# Patient Record
Sex: MALE | Race: WHITE | NOT HISPANIC OR LATINO
[De-identification: names, ages, dates, MRNs, and addresses within clinical notes are randomized per-mention and may not be internally consistent; named-entity substitution may affect disease eponyms.]

---

## 2017-05-16 ENCOUNTER — APPOINTMENT (OUTPATIENT)
Dept: VASCULAR SURGERY | Facility: CLINIC | Age: 32
End: 2017-05-16

## 2019-05-17 ENCOUNTER — INPATIENT (INPATIENT)
Facility: HOSPITAL | Age: 34
LOS: 2 days | Discharge: HOME | DRG: 871 | End: 2019-05-20
Attending: SURGERY | Admitting: SURGERY
Payer: MEDICARE

## 2019-05-17 VITALS
SYSTOLIC BLOOD PRESSURE: 171 MMHG | OXYGEN SATURATION: 96 % | HEIGHT: 76 IN | DIASTOLIC BLOOD PRESSURE: 92 MMHG | HEART RATE: 117 BPM | WEIGHT: 274.03 LBS | TEMPERATURE: 101 F | RESPIRATION RATE: 20 BRPM

## 2019-05-17 PROCEDURE — 99285 EMERGENCY DEPT VISIT HI MDM: CPT | Mod: GC

## 2019-05-17 RX ORDER — ACETAMINOPHEN 500 MG
975 TABLET ORAL ONCE
Refills: 0 | Status: COMPLETED | OUTPATIENT
Start: 2019-05-17 | End: 2019-05-17

## 2019-05-17 RX ORDER — ONDANSETRON 8 MG/1
4 TABLET, FILM COATED ORAL ONCE
Refills: 0 | Status: COMPLETED | OUTPATIENT
Start: 2019-05-17 | End: 2019-05-17

## 2019-05-17 RX ADMIN — ONDANSETRON 4 MILLIGRAM(S): 8 TABLET, FILM COATED ORAL at 23:57

## 2019-05-17 RX ADMIN — Medication 975 MILLIGRAM(S): at 23:56

## 2019-05-18 DIAGNOSIS — K57.20 DIVERTICULITIS OF LARGE INTESTINE WITH PERFORATION AND ABSCESS WITHOUT BLEEDING: ICD-10-CM

## 2019-05-18 LAB
ALBUMIN SERPL ELPH-MCNC: 4.3 G/DL — SIGNIFICANT CHANGE UP (ref 3.3–5)
ALP SERPL-CCNC: 76 U/L — SIGNIFICANT CHANGE UP (ref 40–120)
ALT FLD-CCNC: 15 U/L — SIGNIFICANT CHANGE UP (ref 10–45)
ANION GAP SERPL CALC-SCNC: 17 MMOL/L — SIGNIFICANT CHANGE UP (ref 5–17)
APPEARANCE UR: CLEAR — SIGNIFICANT CHANGE UP
APTT BLD: 25.3 SEC — LOW (ref 27.5–36.3)
AST SERPL-CCNC: 16 U/L — SIGNIFICANT CHANGE UP (ref 10–40)
BACTERIA # UR AUTO: NEGATIVE — SIGNIFICANT CHANGE UP
BASOPHILS # BLD AUTO: 0 K/UL — SIGNIFICANT CHANGE UP (ref 0–0.2)
BASOPHILS NFR BLD AUTO: 0.2 % — SIGNIFICANT CHANGE UP (ref 0–2)
BILIRUB SERPL-MCNC: 0.6 MG/DL — SIGNIFICANT CHANGE UP (ref 0.2–1.2)
BILIRUB UR-MCNC: NEGATIVE — SIGNIFICANT CHANGE UP
BLD GP AB SCN SERPL QL: NEGATIVE — SIGNIFICANT CHANGE UP
BUN SERPL-MCNC: 44 MG/DL — HIGH (ref 7–23)
CALCIUM SERPL-MCNC: 9.6 MG/DL — SIGNIFICANT CHANGE UP (ref 8.4–10.5)
CHLORIDE SERPL-SCNC: 94 MMOL/L — LOW (ref 96–108)
CO2 SERPL-SCNC: 27 MMOL/L — SIGNIFICANT CHANGE UP (ref 22–31)
COLOR SPEC: COLORLESS — SIGNIFICANT CHANGE UP
CREAT SERPL-MCNC: 10.65 MG/DL — HIGH (ref 0.5–1.3)
CULTURE RESULTS: NO GROWTH — SIGNIFICANT CHANGE UP
DIFF PNL FLD: ABNORMAL
EOSINOPHIL # BLD AUTO: 0.1 K/UL — SIGNIFICANT CHANGE UP (ref 0–0.5)
EOSINOPHIL NFR BLD AUTO: 1.2 % — SIGNIFICANT CHANGE UP (ref 0–6)
EPI CELLS # UR: 0 /HPF — SIGNIFICANT CHANGE UP
GAS PNL BLDV: SIGNIFICANT CHANGE UP
GLUCOSE SERPL-MCNC: 119 MG/DL — HIGH (ref 70–99)
GLUCOSE UR QL: ABNORMAL
HCT VFR BLD CALC: 33.8 % — LOW (ref 39–50)
HGB BLD-MCNC: 11.5 G/DL — LOW (ref 13–17)
HYALINE CASTS # UR AUTO: 0 /LPF — SIGNIFICANT CHANGE UP (ref 0–2)
INR BLD: 1.18 RATIO — HIGH (ref 0.88–1.16)
KETONES UR-MCNC: NEGATIVE — SIGNIFICANT CHANGE UP
LEUKOCYTE ESTERASE UR-ACNC: NEGATIVE — SIGNIFICANT CHANGE UP
LIDOCAIN IGE QN: 46 U/L — SIGNIFICANT CHANGE UP (ref 7–60)
LYMPHOCYTES # BLD AUTO: 1.4 K/UL — SIGNIFICANT CHANGE UP (ref 1–3.3)
LYMPHOCYTES # BLD AUTO: 13.2 % — SIGNIFICANT CHANGE UP (ref 13–44)
MCHC RBC-ENTMCNC: 32.1 PG — SIGNIFICANT CHANGE UP (ref 27–34)
MCHC RBC-ENTMCNC: 33.9 GM/DL — SIGNIFICANT CHANGE UP (ref 32–36)
MCV RBC AUTO: 94.6 FL — SIGNIFICANT CHANGE UP (ref 80–100)
MONOCYTES # BLD AUTO: 0.9 K/UL — SIGNIFICANT CHANGE UP (ref 0–0.9)
MONOCYTES NFR BLD AUTO: 8.1 % — SIGNIFICANT CHANGE UP (ref 2–14)
NEUTROPHILS # BLD AUTO: 8.1 K/UL — HIGH (ref 1.8–7.4)
NEUTROPHILS NFR BLD AUTO: 77.3 % — HIGH (ref 43–77)
NITRITE UR-MCNC: NEGATIVE — SIGNIFICANT CHANGE UP
PH UR: 8.5 — HIGH (ref 5–8)
PLATELET # BLD AUTO: 187 K/UL — SIGNIFICANT CHANGE UP (ref 150–400)
POTASSIUM SERPL-MCNC: 3.9 MMOL/L — SIGNIFICANT CHANGE UP (ref 3.5–5.3)
POTASSIUM SERPL-SCNC: 3.9 MMOL/L — SIGNIFICANT CHANGE UP (ref 3.5–5.3)
PROT SERPL-MCNC: 7.4 G/DL — SIGNIFICANT CHANGE UP (ref 6–8.3)
PROT UR-MCNC: ABNORMAL
PROTHROM AB SERPL-ACNC: 13.5 SEC — HIGH (ref 10–12.9)
RBC # BLD: 3.57 M/UL — LOW (ref 4.2–5.8)
RBC # FLD: 12.8 % — SIGNIFICANT CHANGE UP (ref 10.3–14.5)
RBC CASTS # UR COMP ASSIST: 0 /HPF — SIGNIFICANT CHANGE UP (ref 0–4)
RH IG SCN BLD-IMP: NEGATIVE — SIGNIFICANT CHANGE UP
SODIUM SERPL-SCNC: 138 MMOL/L — SIGNIFICANT CHANGE UP (ref 135–145)
SP GR SPEC: 1.01 — LOW (ref 1.01–1.02)
SPECIMEN SOURCE: SIGNIFICANT CHANGE UP
UROBILINOGEN FLD QL: NEGATIVE — SIGNIFICANT CHANGE UP
WBC # BLD: 10.5 K/UL — SIGNIFICANT CHANGE UP (ref 3.8–10.5)
WBC # FLD AUTO: 10.5 K/UL — SIGNIFICANT CHANGE UP (ref 3.8–10.5)
WBC UR QL: 1 /HPF — SIGNIFICANT CHANGE UP (ref 0–5)

## 2019-05-18 PROCEDURE — 74176 CT ABD & PELVIS W/O CONTRAST: CPT | Mod: 26

## 2019-05-18 PROCEDURE — 99222 1ST HOSP IP/OBS MODERATE 55: CPT | Mod: AI

## 2019-05-18 RX ORDER — ACETAMINOPHEN 500 MG
1000 TABLET ORAL ONCE
Refills: 0 | Status: COMPLETED | OUTPATIENT
Start: 2019-05-18 | End: 2019-05-18

## 2019-05-18 RX ORDER — CIPROFLOXACIN LACTATE 400MG/40ML
400 VIAL (ML) INTRAVENOUS ONCE
Refills: 0 | Status: COMPLETED | OUTPATIENT
Start: 2019-05-18 | End: 2019-05-18

## 2019-05-18 RX ORDER — METRONIDAZOLE 500 MG
500 TABLET ORAL ONCE
Refills: 0 | Status: COMPLETED | OUTPATIENT
Start: 2019-05-18 | End: 2019-05-18

## 2019-05-18 RX ORDER — HYDROMORPHONE HYDROCHLORIDE 2 MG/ML
0.5 INJECTION INTRAMUSCULAR; INTRAVENOUS; SUBCUTANEOUS EVERY 4 HOURS
Refills: 0 | Status: DISCONTINUED | OUTPATIENT
Start: 2019-05-18 | End: 2019-05-20

## 2019-05-18 RX ORDER — ALPRAZOLAM 0.25 MG
1 TABLET ORAL AT BEDTIME
Refills: 0 | Status: DISCONTINUED | OUTPATIENT
Start: 2019-05-18 | End: 2019-05-20

## 2019-05-18 RX ORDER — SEVELAMER CARBONATE 2400 MG/1
800 POWDER, FOR SUSPENSION ORAL
Refills: 0 | Status: DISCONTINUED | OUTPATIENT
Start: 2019-05-18 | End: 2019-05-20

## 2019-05-18 RX ORDER — CIPROFLOXACIN LACTATE 400MG/40ML
400 VIAL (ML) INTRAVENOUS DAILY
Refills: 0 | Status: DISCONTINUED | OUTPATIENT
Start: 2019-05-18 | End: 2019-05-19

## 2019-05-18 RX ORDER — HEPARIN SODIUM 5000 [USP'U]/ML
5000 INJECTION INTRAVENOUS; SUBCUTANEOUS EVERY 8 HOURS
Refills: 0 | Status: DISCONTINUED | OUTPATIENT
Start: 2019-05-18 | End: 2019-05-20

## 2019-05-18 RX ORDER — ALPRAZOLAM 0.25 MG
0 TABLET ORAL
Qty: 0 | Refills: 0 | DISCHARGE

## 2019-05-18 RX ORDER — SEVELAMER CARBONATE 2400 MG/1
1 POWDER, FOR SUSPENSION ORAL
Qty: 0 | Refills: 0 | DISCHARGE

## 2019-05-18 RX ORDER — MULTIVIT-MIN/FERROUS GLUCONATE 9 MG/15 ML
0 LIQUID (ML) ORAL
Qty: 0 | Refills: 0 | DISCHARGE

## 2019-05-18 RX ORDER — SODIUM CHLORIDE 9 MG/ML
1000 INJECTION, SOLUTION INTRAVENOUS
Refills: 0 | Status: DISCONTINUED | OUTPATIENT
Start: 2019-05-18 | End: 2019-05-19

## 2019-05-18 RX ORDER — SODIUM CHLORIDE 9 MG/ML
1000 INJECTION INTRAMUSCULAR; INTRAVENOUS; SUBCUTANEOUS ONCE
Refills: 0 | Status: COMPLETED | OUTPATIENT
Start: 2019-05-18 | End: 2019-05-18

## 2019-05-18 RX ORDER — METRONIDAZOLE 500 MG
500 TABLET ORAL EVERY 8 HOURS
Refills: 0 | Status: DISCONTINUED | OUTPATIENT
Start: 2019-05-18 | End: 2019-05-19

## 2019-05-18 RX ORDER — MULTIVIT-MIN/FERROUS GLUCONATE 9 MG/15 ML
1 LIQUID (ML) ORAL DAILY
Refills: 0 | Status: DISCONTINUED | OUTPATIENT
Start: 2019-05-18 | End: 2019-05-20

## 2019-05-18 RX ADMIN — Medication 1000 MILLIGRAM(S): at 23:55

## 2019-05-18 RX ADMIN — SODIUM CHLORIDE 1000 MILLILITER(S): 9 INJECTION INTRAMUSCULAR; INTRAVENOUS; SUBCUTANEOUS at 00:46

## 2019-05-18 RX ADMIN — Medication 1 TABLET(S): at 17:56

## 2019-05-18 RX ADMIN — Medication 100 MILLIGRAM(S): at 13:14

## 2019-05-18 RX ADMIN — Medication 1 MILLIGRAM(S): at 21:13

## 2019-05-18 RX ADMIN — Medication 100 MILLIGRAM(S): at 21:05

## 2019-05-18 RX ADMIN — Medication 200 MILLIGRAM(S): at 04:43

## 2019-05-18 RX ADMIN — HEPARIN SODIUM 5000 UNIT(S): 5000 INJECTION INTRAVENOUS; SUBCUTANEOUS at 21:05

## 2019-05-18 RX ADMIN — Medication 100 MILLIGRAM(S): at 07:52

## 2019-05-18 RX ADMIN — Medication 975 MILLIGRAM(S): at 01:34

## 2019-05-18 RX ADMIN — SEVELAMER CARBONATE 800 MILLIGRAM(S): 2400 POWDER, FOR SUSPENSION ORAL at 17:56

## 2019-05-18 RX ADMIN — SODIUM CHLORIDE 30 MILLILITER(S): 9 INJECTION, SOLUTION INTRAVENOUS at 21:05

## 2019-05-18 RX ADMIN — HEPARIN SODIUM 5000 UNIT(S): 5000 INJECTION INTRAVENOUS; SUBCUTANEOUS at 13:14

## 2019-05-18 RX ADMIN — Medication 400 MILLIGRAM(S): at 23:25

## 2019-05-18 NOTE — H&P ADULT - ASSESSMENT
33M w/ PMHx as noted p/w 1 day lower ab pain w/ assoc fever, chills and diarrhea now found to have diverticulitis with small collection in adjacent mesentery, otherwise hemodynamically stable    - Admit to Dr Raymond  - NPO/ IVF  - IV Abx  - Consult Nephrology Dr Cruz is his nephrologist, for HD while admitted  - c/w IV equivalents of home meds  - Pain control  - DVT ppx    [facility] [place] PGY-2  ACS [id number]

## 2019-05-18 NOTE — ED PROVIDER NOTE - PHYSICAL EXAMINATION
Nanci Martinez M.D.:   patient awake alert seen lying on stretcher NAD .   LUNGS CTAB no wheeze no crackle.   CARD tachycardic no m/r/g.    Abdomen soft ttp in lower abdomen ND no rebound no guarding no CVA tenderness.   EXT WWP no edema no calf tenderness CV 2+DP/PT bilaterally.   neuro A&Ox3 gait normal.    skin warm and dry no rash  HEENT: moist mucous membranes, PERRL, EOMI

## 2019-05-18 NOTE — H&P ADULT - ATTENDING COMMENTS
Pt seen and examined, agree with above.    1. Sigmoid diverticulitis (first episode) with sepsis and contained perforation into the sigmoid mesentery:  - IV antibiotics  - IVF  - NPO until pain resolves/ significantly improves  - I discussed diverticular disease, potential for diverticula to bleed or get infected, presence of diverticulitis with small perforation on CT (based on my personal review of the images), management including trial of non-operative management unless pain worsens or does not improve, need for colonoscopy at 6-8 weeks with patient and his family, who expressed their understanding and agreement.    2. ESRD on HD:  - Contact Nephrology; last HD yesterday

## 2019-05-18 NOTE — ED PROVIDER NOTE - OBJECTIVE STATEMENT
Nanci Martinez M.D: 33M hx ESRD on HD MWF (last HD today) p/w 1 day of fever, lower abdominal pain N/V. +diarrhea which is chronic per pt and is unchanged from normal. notes feeling fatigued the last few days. went to urgent care who sent him here over concern for otf sheldon. pt notes they found blood in his urine as well.

## 2019-05-18 NOTE — ED ADULT NURSE REASSESSMENT NOTE - NS ED NURSE REASSESS COMMENT FT1
Pt is breathing unlabored on RA. VSS. Pt endorses mild abd pain at this time, improved since medication administered as per MD order. Pt provided with oral contrast at this time. Pt instructed to take small sips at a time and to call for assistance if he feels nauseous. Educated pt on plan of care. Safety and comfort maintained. Awaiting CT scan.

## 2019-05-18 NOTE — PROVIDER CONTACT NOTE (OTHER) - ACTION/TREATMENT ORDERED:
As per MD, rash is not related to medication, appears to be getting better. No further actions taken. Will continue to monitor.

## 2019-05-18 NOTE — CONSULT NOTE ADULT - SUBJECTIVE AND OBJECTIVE BOX
[facility]  [phone]  NEPHROLOGY      INITIAL CONSULT NOTE  --------------------------------------------------------------------------------  HPI:      33M w/ PMHx of ESRD  due to Lithium use on HD M/W/F, HTN, NE presenting with 1 day abd pain with assoc fevers, chills and diarrhea. Presented to PCP at [facility] was febrile there with lower ab tenderness so directed to ED. On presentation here, febrile to 101.4 and tachycardic to 117,  improved with fluids and antibiotics. Labs demonstrated left shift but no leukocytosis , . CT performed consistent with acute complicated diverticulitis with proximal sigmoid inflammation and collection measuring 2.4 x 0.8 cm in adjacent mesentery, renal consult called. pt has hx of bipolar disorder       PAST HISTORY  --------------------------------------------------------------------------------  PAST MEDICAL & SURGICAL HISTORY:  ESRD (end stage renal disease)  No significant past surgical history    FAMILY HISTORY:  mother esrd --> renal transplant     PAST SOCIAL HISTORY: no tobacco no alcohol     ALLERGIES & MEDICATIONS  --------------------------------------------------------------------------------  Allergies    minocycline (Other)  Prozac (Other)  red dye (Unknown)    Intolerances      Standing Inpatient Medications  acetaminophen  IVPB .. 1000 milliGRAM(s) IV Intermittent once  ciprofloxacin   IVPB 400 milliGRAM(s) IV Intermittent daily  heparin  Injectable 5000 Unit(s) SubCutaneous every 8 hours  lactated ringers. 1000 milliLiter(s) IV Continuous <Continuous>  metroNIDAZOLE  IVPB 500 milliGRAM(s) IV Intermittent every 8 hours    PRN Inpatient Medications  HYDROmorphone  Injectable 0.5 milliGRAM(s) IV Push every 4 hours PRN      REVIEW OF SYSTEMS  --------------------------------------------------------------------------------  Gen: +   fevers/chills  now feels better  Skin: No rashes  Head/Eyes/Ears/Mouth: No headache; Normal hearing;  No sinus pain/discomfort, sore throat  Respiratory: No dyspnea, cough, wheezing  CV: No chest pain, orthopnea  GI:  + abdominal pain, diarrhea, nausea, vomiting, melena.   : No dysuria,   MSK: No joint pain/swelling; no back pain  Neuro: No dizziness/lightheadedness,   also with no edema     All other systems were reviewed and are negative, except as noted.    VITALS/PHYSICAL EXAM  --------------------------------------------------------------------------------  T(C): 37.6 (05-18-19 @ 14:42), Max: 38.6 (05-17-19 @ 21:17)  HR: 89 (05-18-19 @ 14:42) (83 - 117)  BP: 147/86 (05-18-19 @ 14:42) (127/73 - 171/92)  RR: 17 (05-18-19 @ 14:42) (16 - 20)  SpO2: 96% (05-18-19 @ 14:42) (95% - 97%)  Wt(kg): --  Height (cm): 193.04 (05-17-19 @ 21:17)  Weight (kg): 124.3 (05-17-19 @ 21:17)  BMI (kg/m2): 33.4 (05-17-19 @ 21:17)  BSA (m2): 2.53 (05-17-19 @ 21:17)      05-18-19 @ 07:01  -  05-18-19 @ 15:11  --------------------------------------------------------  IN: 575 mL / OUT: 200 mL / NET: 375 mL      Physical Exam:  	Gen: Non toxic   	no jvd   	Pulm: decrease bs  no rales or ronchi or wheezing  	CV: RRR, S1S2; no rub  	Back: No CVA tenderness; no sacral edema  	Abd: +BS, soft, + tender/nondistended  	: No suprapubic tenderness  	UE: Warm, no cyanosis  no clubbing,  no edema; no asterixis  	LE: Warm, no cyanosis  no clubbing, no edema  	Neuro: alert and oriented. speech coherent   	Psych: anxious  	Skin: Warm, no decrease skin turgor   	Vascular access: + avf + bruit and thrill    LABS/STUDIES  --------------------------------------------------------------------------------              11.5   10.5  >-----------<  187      [05-18-19 @ 00:01]              33.8     138  |  94  |  44  ----------------------------<  119      [05-18-19 @ 00:01]  3.9   |  27  |  10.65        Ca     9.6     [05-18-19 @ 00:01]    TPro  7.4  /  Alb  4.3  /  TBili  0.6  /  DBili  x   /  AST  16  /  ALT  15  /  AlkPhos  76  [05-18-19 @ 00:01]    PT/INR: PT 13.5 , INR 1.18       [05-18-19 @ 04:52]  PTT: 25.3       [05-18-19 @ 04:52]      Creatinine Trend:  SCr 10.65 [05-18 @ 00:01]    Urinalysis - [05-18-19 @ 00:01]      Color Colorless / Appearance Clear / SG 1.006 / pH 8.5      Gluc 100 mg/dL / Ketone Negative  / Bili Negative / Urobili Negative       Blood Small / Protein 100 mg/dL / Leuk Est Negative / Nitrite Negative      RBC 0 / WBC 1 / Hyaline 0 / Gran  / Sq Epi  / Non Sq Epi 0 / Bacteria Negative

## 2019-05-18 NOTE — H&P ADULT - NSHPPHYSICALEXAM_GEN_ALL_CORE
Gen: sitting upright in bed NAD  Resp: breathing comfortably on RA  Cardiac: RRR  GI: soft, obese, TTP across lower abdomen L > R. No rebound or guarding.  Exxt: warm, moving all ext

## 2019-05-18 NOTE — H&P ADULT - HISTORY OF PRESENT ILLNESS
33M w/ PMHx of ESRD on HD M/W/F, HTN, NE presenting with 1 day ab pain with assoc fevers, chills and diarrhea. Presented to PCP at [facility] was febrile there with lower ab tenderness so directed to ED for concern fo appendicitis. On presentation here, febrile to 101.4 and tachycardic to 117, now improved with fluids and antibiotics. Labs demonstrated left shift but no leukocytosis , and elevated Cre given picture of ESRD. CT performed consistent with acute complicated diverticulitis with proximal sigmoid inflammation and collection measuring 2.4 x 0.8 cm in adjacent mesentery,

## 2019-05-18 NOTE — CONSULT NOTE ADULT - ASSESSMENT
33 m with  Perforated diverticulitis  - antibiotics  - serial abdominal exams  - NPO for now    ESRD  - HD  - Nephrology follow Dr. Cruz    Anxiety control  Jaun Pretty MD pager [phone]
33M w/ PMHx of ESRD  due to Lithium use on HD M/W/F, HTN, NE presenting with 1 day abd pain with assoc fevers, chills and diarrhea. Labs demonstrated left shift but no leukocytosis , . CT performed consistent with acute complicated diverticulitis with proximal sigmoid inflammation and collection measuring 2.4 x 0.8 cm in adjacent mesentery,    1- esrd  2- htn   3- complicated diverticulitis  4- shpt     hd consent obtained witnessed and placed in chart  no hd today had hd yesterday   cipro 400 mg iv qd  flagyl 500 mg iv q8   monitor bp   decrease IVF and change to NS [ given hx esrd ]  d/w surgery team

## 2019-05-18 NOTE — H&P ADULT - NSHPLABSRESULTS_GEN_ALL_CORE
11.5   10.5  )-----------( 187      ( 18 May 2019 00:01 )             33.8   05-18    138  |  94<L>  |  44<H>  ----------------------------<  119<H>  3.9   |  27  |  10.65<H>    Ca    9.6      18 May 2019 00:01    TPro  7.4  /  Alb  4.3  /  TBili  0.6  /  DBili  x   /  AST  16  /  ALT  15  /  AlkPhos  76  05-18    Bowel: No bowel obstruction.  Normal appendix.  Focal wall thickening in   the proximal sigmoid colon with an inflamed diverticula and surrounding   mesenteric inflammatory changes, compatible with acute diverticulitis.    There is evidence of focal contained perforation with a 2.4 x 0.8 cm   collection of gas and trace fluid in the adjacent mesentery, which   demonstrates a small tract coursing to the inflamed sigmoid colon.  There   is no discrete abscess or drainable collection.  Peritoneum: No ascites or remote free air.    Retroperitoneum: No lymphadenopathy.  Vasculature: Within normal limits.    Abdominal wall/soft tissues: Small fat-containing left inguinal hernia   and questionable tiny fat-containing right inguinal hernia.  Bones: At L5-S1 there is minimal grade 1 retrolisthesis, mild facet   hypertrophy and a small/early disc osteophyte complex producing moderate   left and mild right neural foraminal narrowing.      IMPRESSION:  Acute diverticulitis of the proximal sigmoid colon with a focal contained   perforation.  There is a 2.4 x 0.8 cm collection of gas and trace fluid   in the adjacent mesentery, that demonstrates a small tract coursing to   the inflamed sigmoid colon.  No discrete abscess or drainable collection.    No remote free air.  If the patient is managed conservatively, it is   recommended that a follow-up colonoscopy be obtained after antibiotic   therapy and resolution of acute symptoms to exclude underlying colon   cancer.    < end of copied text >

## 2019-05-18 NOTE — ED PROVIDER NOTE - ATTENDING CONTRIBUTION TO CARE
34 y/o male with the above documented history and HPI who on exam appears well and comfortable. VSs noted, sclerae anicteric, MM's moist, neck supple, lungs CTA, cardiac sounds s/ audible m/r/g, abdomen soft, ND c/ tenderness across its lower aspect, greatest in the LLQ s/ guarding c/ an element of rebound, extremities s/ asymmetry, skin s/ rash or jaundice and neurologically intact. Appropriate screening studies obtained. Analgesic, anti-emetic, IVF and ABXs provided. Gen Surg consulted. Their plan will be ours.

## 2019-05-18 NOTE — CONSULT NOTE ADULT - SUBJECTIVE AND OBJECTIVE BOX
HPI:  33M w/ PMHx of ESRD on HD M/W/F, HTN, NE presenting with 1 day ab pain with assoc fevers, chills and diarrhea. Presented to PCP at [facility] was febrile there with lower ab tenderness so directed to ED for concern fo appendicitis. On presentation here, febrile to 101.4 and tachycardic to 117, now improved with fluids and antibiotics. Labs demonstrated left shift but no leukocytosis , and elevated Cre given picture of ESRD. CT performed consistent with acute complicated diverticulitis with proximal sigmoid inflammation and collection measuring 2.4 x 0.8 cm in adjacent mesentery,       PAST MEDICAL & SURGICAL HISTORY:  ESRD (end stage renal disease)  No significant past surgical history      Review of Systems:   CONSTITUTIONAL: No fever, weight loss, or fatigue  EYES: No eye pain, visual disturbances, or discharge  ENMT:  No difficulty hearing, tinnitus, vertigo; No sinus or throat pain  NECK: No pain or stiffness  BREASTS: No pain, masses, or nipple discharge  RESPIRATORY: No cough, wheezing, chills or hemoptysis; No shortness of breath  CARDIOVASCULAR: No chest pain, palpitations, dizziness, or leg swelling  GASTROINTESTINAL: + abdominal pain. No nausea, vomiting, or hematemesis; No diarrhea or constipation. No melena or hematochezia.  GENITOURINARY: No dysuria, frequency, hematuria, or incontinence  NEUROLOGICAL: No headaches, memory loss, loss of strength, numbness, or tremors  SKIN: No itching, burning, rashes, or lesions   LYMPH NODES: No enlarged glands  ENDOCRINE: No heat or cold intolerance; No hair loss  MUSCULOSKELETAL: No joint pain or swelling; No muscle, back, or extremity pain  PSYCHIATRIC: No depression, anxiety, mood swings, or difficulty sleeping  HEME/LYMPH: No easy bruising, or bleeding gums  ALLERY AND IMMUNOLOGIC: No hives or eczema    Allergies    minocycline (Other)  Prozac (Other)  red dye (Unknown)    Intolerances        Social History:     FAMILY HISTORY:  No pertinent family history in first degree relatives      MEDICATIONS  (STANDING):  acetaminophen  IVPB .. 1000 milliGRAM(s) IV Intermittent once  ciprofloxacin   IVPB 400 milliGRAM(s) IV Intermittent daily  heparin  Injectable 5000 Unit(s) SubCutaneous every 8 hours  lactated ringers. 1000 milliLiter(s) (30 mL/Hr) IV Continuous <Continuous>  metroNIDAZOLE  IVPB 500 milliGRAM(s) IV Intermittent every 8 hours    MEDICATIONS  (PRN):  HYDROmorphone  Injectable 0.5 milliGRAM(s) IV Push every 4 hours PRN Severe Pain (7 - 10)        CAPILLARY BLOOD GLUCOSE        I&O's Summary    18 May 2019 07:01  -  18 May 2019 15:06  --------------------------------------------------------  IN: 575 mL / OUT: 200 mL / NET: 375 mL        PHYSICAL EXAM:  GENERAL: NAD, well-developed  HEAD:  Atraumatic, Normocephalic  EYES: EOMI, PERRLA, conjunctiva and sclera clear  NECK: Supple, No JVD  CHEST/LUNG: Clear to auscultation bilaterally; No wheeze  HEART: Regular rate and rhythm; No murmurs, rubs, or gallops  ABDOMEN: Soft, low abdominal tender, Nondistended; Bowel sounds present  EXTREMITIES:  2+ Peripheral Pulses, No clubbing, cyanosis, or edema  PSYCH: AAOx3 anxious  NEUROLOGY: non-focal  SKIN: No rashes or lesions    LABS:                        11.5   10.5  )-----------( 187      ( 18 May 2019 00:01 )             33.8     05-18    138  |  94<L>  |  44<H>  ----------------------------<  119<H>  3.9   |  27  |  10.65<H>    Ca    9.6      18 May 2019 00:01    TPro  7.4  /  Alb  4.3  /  TBili  0.6  /  DBili  x   /  AST  16  /  ALT  15  /  AlkPhos  76  05-18    PT/INR - ( 18 May 2019 04:52 )   PT: 13.5 sec;   INR: 1.18 ratio         PTT - ( 18 May 2019 04:52 )  PTT:25.3 sec      Urinalysis Basic - ( 18 May 2019 00:01 )    Color: Colorless / Appearance: Clear / S.006 / pH: x  Gluc: x / Ketone: Negative  / Bili: Negative / Urobili: Negative   Blood: x / Protein: 100 mg/dL / Nitrite: Negative   Leuk Esterase: Negative / RBC: 0 /hpf / WBC 1 /HPF   Sq Epi: x / Non Sq Epi: 0 /hpf / Bacteria: Negative        RADIOLOGY & ADDITIONAL TESTS:    Imaging Personally Reviewed:    Consultant(s) Notes Reviewed:      Care Discussed with Consultants/Other Providers:

## 2019-05-18 NOTE — ED ADULT NURSE NOTE - OBJECTIVE STATEMENT
34y/o male presented to the ED from home with complaint of abdominal pain. A&Ox3,ambulatory. PMH- ESRD, left upper arm AV fistula, MWF dialysis, received dialysis today. reports x1 day fever associated with generalized lower abdominal pain and intermittent nausea. Patient states he was seen at urgent care earlier where he was told "theres blood in the urine". Urgent care also sent patient to ED to r/o appendicitis. generalized lower abdominal tenderness upon palpation. Denies chest pain , SOB, palpations, urinary symptoms. Patient resting comfortably in bed, no acute distress, family at bedside.

## 2019-05-18 NOTE — ED PROVIDER NOTE - CARE PLAN
Principal Discharge DX:	Diverticulitis of large intestine with perforation, unspecified bleeding status

## 2019-05-18 NOTE — ED PROVIDER NOTE - PROGRESS NOTE DETAILS
Nanci Martinez M.D: CT shows perforated diverticulitis, antibiotics ordered, surgery consulted. Nanci Martinez M.D: surgery accepting to dr vick

## 2019-05-18 NOTE — ED ADULT NURSE NOTE - NSIMPLEMENTINTERV_GEN_ALL_ED
Implemented All Universal Safety Interventions:  [place] to call system. Call bell, personal items and telephone within reach. Instruct patient to call for assistance. Room bathroom lighting operational. Non-slip footwear when patient is off stretcher. Physically safe environment: no spills, clutter or unnecessary equipment. Stretcher in lowest position, wheels locked, appropriate side rails in place.

## 2019-05-19 ENCOUNTER — TRANSCRIPTION ENCOUNTER (OUTPATIENT)
Age: 34
End: 2019-05-19

## 2019-05-19 LAB
ANION GAP SERPL CALC-SCNC: 21 MMOL/L — HIGH (ref 5–17)
BUN SERPL-MCNC: 58 MG/DL — HIGH (ref 7–23)
CALCIUM SERPL-MCNC: 9.3 MG/DL — SIGNIFICANT CHANGE UP (ref 8.4–10.5)
CHLORIDE SERPL-SCNC: 97 MMOL/L — SIGNIFICANT CHANGE UP (ref 96–108)
CO2 SERPL-SCNC: 21 MMOL/L — LOW (ref 22–31)
CREAT SERPL-MCNC: 13.7 MG/DL — HIGH (ref 0.5–1.3)
GLUCOSE SERPL-MCNC: 80 MG/DL — SIGNIFICANT CHANGE UP (ref 70–99)
HCT VFR BLD CALC: 31.9 % — LOW (ref 39–50)
HGB BLD-MCNC: 10.1 G/DL — LOW (ref 13–17)
MAGNESIUM SERPL-MCNC: 2.6 MG/DL — SIGNIFICANT CHANGE UP (ref 1.6–2.6)
MCHC RBC-ENTMCNC: 31.2 PG — SIGNIFICANT CHANGE UP (ref 27–34)
MCHC RBC-ENTMCNC: 31.7 GM/DL — LOW (ref 32–36)
MCV RBC AUTO: 98.5 FL — SIGNIFICANT CHANGE UP (ref 80–100)
PHOSPHATE SERPL-MCNC: 8.6 MG/DL — HIGH (ref 2.5–4.5)
PLATELET # BLD AUTO: 176 K/UL — SIGNIFICANT CHANGE UP (ref 150–400)
POTASSIUM SERPL-MCNC: 4.4 MMOL/L — SIGNIFICANT CHANGE UP (ref 3.5–5.3)
POTASSIUM SERPL-SCNC: 4.4 MMOL/L — SIGNIFICANT CHANGE UP (ref 3.5–5.3)
RBC # BLD: 3.24 M/UL — LOW (ref 4.2–5.8)
RBC # FLD: 12.7 % — SIGNIFICANT CHANGE UP (ref 10.3–14.5)
SODIUM SERPL-SCNC: 139 MMOL/L — SIGNIFICANT CHANGE UP (ref 135–145)
WBC # BLD: 6.42 K/UL — SIGNIFICANT CHANGE UP (ref 3.8–10.5)
WBC # FLD AUTO: 6.42 K/UL — SIGNIFICANT CHANGE UP (ref 3.8–10.5)

## 2019-05-19 PROCEDURE — 99232 SBSQ HOSP IP/OBS MODERATE 35: CPT

## 2019-05-19 RX ORDER — LIDOCAINE AND PRILOCAINE CREAM 25; 25 MG/G; MG/G
1 CREAM TOPICAL DAILY
Refills: 0 | Status: DISCONTINUED | OUTPATIENT
Start: 2019-05-19 | End: 2019-05-20

## 2019-05-19 RX ORDER — METRONIDAZOLE 500 MG
500 TABLET ORAL EVERY 8 HOURS
Refills: 0 | Status: DISCONTINUED | OUTPATIENT
Start: 2019-05-19 | End: 2019-05-20

## 2019-05-19 RX ORDER — CHLORHEXIDINE GLUCONATE 213 G/1000ML
1 SOLUTION TOPICAL
Refills: 0 | Status: DISCONTINUED | OUTPATIENT
Start: 2019-05-19 | End: 2019-05-20

## 2019-05-19 RX ORDER — CIPROFLOXACIN LACTATE 400MG/40ML
500 VIAL (ML) INTRAVENOUS EVERY 24 HOURS
Refills: 0 | Status: DISCONTINUED | OUTPATIENT
Start: 2019-05-19 | End: 2019-05-20

## 2019-05-19 RX ADMIN — SEVELAMER CARBONATE 800 MILLIGRAM(S): 2400 POWDER, FOR SUSPENSION ORAL at 17:34

## 2019-05-19 RX ADMIN — Medication 500 MILLIGRAM(S): at 22:13

## 2019-05-19 RX ADMIN — SEVELAMER CARBONATE 800 MILLIGRAM(S): 2400 POWDER, FOR SUSPENSION ORAL at 08:20

## 2019-05-19 RX ADMIN — HEPARIN SODIUM 5000 UNIT(S): 5000 INJECTION INTRAVENOUS; SUBCUTANEOUS at 21:59

## 2019-05-19 RX ADMIN — SEVELAMER CARBONATE 800 MILLIGRAM(S): 2400 POWDER, FOR SUSPENSION ORAL at 11:49

## 2019-05-19 RX ADMIN — Medication 1 TABLET(S): at 11:49

## 2019-05-19 RX ADMIN — HEPARIN SODIUM 5000 UNIT(S): 5000 INJECTION INTRAVENOUS; SUBCUTANEOUS at 05:18

## 2019-05-19 RX ADMIN — Medication 1 MILLIGRAM(S): at 22:31

## 2019-05-19 RX ADMIN — CHLORHEXIDINE GLUCONATE 1 APPLICATION(S): 213 SOLUTION TOPICAL at 08:22

## 2019-05-19 RX ADMIN — HEPARIN SODIUM 5000 UNIT(S): 5000 INJECTION INTRAVENOUS; SUBCUTANEOUS at 14:03

## 2019-05-19 RX ADMIN — Medication 100 MILLIGRAM(S): at 05:18

## 2019-05-19 RX ADMIN — Medication 200 MILLIGRAM(S): at 11:48

## 2019-05-19 NOTE — PROGRESS NOTE ADULT - ATTENDING COMMENTS
Pt seen and examined.  Chart reviewed.  Resident note confirmed.  Pt is a 33 year old male with a medical history significant for ESRD (on HD M/W/F), HTN, NE who presented to [facility] with 1 day of abdominal pain/fevers/chills/diarrhea. CT was performed and revealed acute complicated diverticulitis with proximal sigmoid inflammation and collection measuring 2.4 x 0.8 cm in adjacent mesentery. He was started on abx with good effect.  Pt advanced to clear liq diet yesterday. No acute events overnight.     PMH/PSH/MEDS/ALL/SH/FH/ROS:  Unchanged from H&P above  Vitals/PE/Labs/Radiographic data:  Reviewed     A/P  Neuro:  Resolution of abdominal pain  	Continue PRN pain control	    CVS:	CAD/HTN  	Monitor vitals    Pulm:  	Atelectasis  	ISP    GI: 	Diverticulitis  	CT findings reviewed  	Adv diet    :  	ESRD  	For HD tomorrow    Heme:	Anemia  	Monitor H/h    ID: 	Diverticulitis  	Transition to PO abx     Endo:	No active issues  	Continue to monitor    Proph:  Start DVT proph

## 2019-05-19 NOTE — DISCHARGE NOTE PROVIDER - CARE PROVIDER_API CALL
Brad Collins)  ColonRectal Surgery; Surgery  [address], [address]  [place] [address]  Phone: [phone]  Fax: [phone]  Follow Up Time:

## 2019-05-19 NOTE — DISCHARGE NOTE PROVIDER - HOSPITAL COURSE
33M w/ PMHx of ESRD on HD M/W/F, HTN, NE presenting with 1 day ab pain with assoc fevers, chills and diarrhea. Presented to PCP at [facility] was febrile there with lower ab tenderness so directed to ED for concern fo appendicitis. On presentation here, febrile to 101.4 and tachycardic to 117, now improved with fluids and antibiotics. Labs demonstrated left shift but no leukocytosis , and elevated Cre given picture of ESRD. CT performed consistent with acute complicated diverticulitis with proximal sigmoid inflammation and collection measuring 2.4 x 0.8 cm in adjacent mesentery,        Patient admitted for management of sigmoid diverticulitis on 5/17 to be treated with IV antibiotics. Nephrology was consulted given HD and ESRD. His diet was advanced on 5/19 without problems. His pain improved. He received a HD treatment on 5/20 prior to discharge to be discharged continuing his MWF scheduling. At the time of discharge, the patient was hemodynamically stable, was tolerating PO diet, was voiding urine and passing stool, was ambulating, and was comfortable with adequate pain control. The patient was instructed to follow up with Dr. Collins within 1-2 weeks after discharge from the hospital. The patient felt comfortable with discharge. The patient was discharged to home. The patient had no other issues.

## 2019-05-19 NOTE — DISCHARGE NOTE PROVIDER - NSDCCPCAREPLAN_GEN_ALL_CORE_FT
[facility] [address]  History & Physical  Gynecology    SUBJECTIVE:     Chief Complaint/Reason for Admission: thickened endometrium    History of Present Illness:  Patient is a 59 y.o. who presents with episode of spotting with thickened lining.  Emb normal   Here for a dx hysteroscopy .       (Not in a hospital admission)    Review of patient's allergies indicates:   Allergen Reactions    Histamine h2 inhibitors      Acute anxiety and increased stomach/chest symptoms    Tuna oil Diarrhea       Past Medical History:   Diagnosis Date    Allergic rhinitis     GERD (gastroesophageal reflux disease)     Hypertension     Knee pain     Liver cyst 2016    FOLLOWED BY [facility]    Shingles outbreak 10/2017     Past Surgical History:   Procedure Laterality Date     SECTION, CLASSIC      CHOLECYSTECTOMY      COLONOSCOPY  2013    repeat in 5 years    COLONOSCOPY N/A 2018    Performed by [facility]as [facility] at [facility] (4TH FLR)    COLONOSCOPY N/A 2013    [facility]formed by [facility] at [facility] (4TH FLR)    EGD (ESOPHAGOGASTRODUODENOSCOPY) N/A 2018    Performed by [facility] at [facility] (4TH FLR)    EGD (ESOPHAGOGASTRODUODENOSCOPY) N/A 2013    Performed by [facility] at [facility] (4TH FLR)    KNEE SURGERY      TOTAL KNEE ARTHROPLASTY Left 2014    WISDOM TOOTH EXTRACTION       Family History   Problem Relation Age of Onset    Cancer Maternal Aunt 80        colon passed age 80    Colon cancer Maternal Aunt 80    Alzheimer's disease Mother 70    Hypertension Father     Dementia Father 79    Hypertension Sister     Hypertension Brother     Celiac disease Neg Hx     Colon polyps Neg Hx     Crohn's disease Neg Hx     Esophageal cancer Neg Hx     Inflammatory bowel disease Neg Hx     Liver cancer Neg Hx     Stomach cancer Neg Hx     Ulcerative colitis Neg Hx     Liver disease Neg Hx      Social History      Tobacco Use    Smoking status: Never Smoker    Smokeless tobacco: Never Used   Substance Use Topics    Alcohol use: No    Drug use: No       Review of Systems:  Constitutional: no fever or chills  Respiratory: no cough or shortness of breath  Cardiovascular: no chest pain or palpitations  Genitourinary: no hematuria or dysuria     OBJECTIVE:     Vital Signs (Most Recent):  BP: 120/70 (04/12/19 0921)    Physical Exam:  General: well developed, well nourished  Lungs:  clear to auscultation bilaterally and normal respiratory effort  Cardiovascular: Heart: regular rate and rhythm, S1, S2 normal, no murmur, click, rub or gallop. Chest Wall: no tenderness. Extremities: no cyanosis or edema, or clubbing. Pulses: 2+ and symmetric.  Pelvic:   Normal     Laboratory:  Lab Results   Component Value Date    WBC 4.69 02/13/2019    HGB 13.6 02/13/2019    HCT 41.1 02/13/2019    MCV 95 02/13/2019     02/13/2019       Ultrasound:  Results for orders placed in visit on 02/13/19   US Pelvis Comp with Transvag NON-OB (xpd    Narrative EXAMINATION:  US PELVIS COMP WITH TRANSVAG NON-OB (XPD)    CLINICAL HISTORY:  Abnormal findings on diagnostic imaging of other specified body structures    TECHNIQUE:  Transabdominal sonography of the pelvis was performed, followed by transvaginal sonography to better evaluate the uterus and ovaries.    COMPARISON:  None.    FINDINGS:  Uterus:    Size: 9 x 5 x 6 cm    Masses: None    Endometrium: Markedly thickening in this postmenopausal patient measuring 16 mm.  There is trace fluid within the cervical canal and endometrial cavity.    Right ovary: Not seen.    Left ovary:    Size: 3 x 2 x 3 cm    Appearance: Small 2 cm anechoic lesion identified, likely a cyst.    Vascular Flow: Normal.    Free Fluid:    None.      Impression 1. Markedly thickened endometrium, possibly related to hyperplasia or malignancy.  Clinical considerations will determine the need for histologic sampling.  2. 2 cm  anechoic left adnexal lesion, presumably a cyst.  3. Trace fluid within the cervical canal.      Electronically signed by: Jd Hernandez MD  Date:    02/13/2019  Time:    13:32           ASSESSMENT/PLAN:     There are no hospital problems to display for this patient.      To OR for hysteroscopy   Risks and benefits explained in detail to patient, including but not limited to damage to internal organs, including but not limited to bowel, bladder, uterus, tubes, ovaries, nerves, arteries, veins, possible need for blood transfusion. Patient is aware of all risks and desires surgery. All questions answered. Consents signed.    PRINCIPAL DISCHARGE DIAGNOSIS  Diagnosis: Diverticulitis of large intestine with perforation, unspecified bleeding status  Assessment and Plan of Treatment: ANTIBIOTICS: please continue to take antibiotics as prescribed  HEMODIALYSIS: continue your MWF schedule  BATHING: No change  ACTIVITY: No heavy lifting or straining.  DIET: Return to your usual diet.  NOTIFY YOUR SURGEON IF: You have any fever (over 100.4 F) or chills, persistent nausea/vomiting, persistent diarrhea, or if your pain is not controlled on your discharge pain medications.  PAIN CONTROL: Please take over the counter tylenol and motrin for pain control as needed.  FOLLOW-UP:  1. Follow-up with Dr. Collins within 1-2 weeks of discharge.  Please call office for appointment  2. Please follow up with your primary care physician within 2 weeks regarding your hospitalization. Call his/her office to make an appointment.

## 2019-05-20 ENCOUNTER — TRANSCRIPTION ENCOUNTER (OUTPATIENT)
Age: 34
End: 2019-05-20

## 2019-05-20 VITALS
TEMPERATURE: 99 F | RESPIRATION RATE: 18 BRPM | SYSTOLIC BLOOD PRESSURE: 156 MMHG | OXYGEN SATURATION: 98 % | DIASTOLIC BLOOD PRESSURE: 92 MMHG | HEART RATE: 93 BPM

## 2019-05-20 LAB
ANION GAP SERPL CALC-SCNC: 22 MMOL/L — HIGH (ref 5–17)
BUN SERPL-MCNC: 76 MG/DL — HIGH (ref 7–23)
CALCIUM SERPL-MCNC: 9.3 MG/DL — SIGNIFICANT CHANGE UP (ref 8.4–10.5)
CHLORIDE SERPL-SCNC: 99 MMOL/L — SIGNIFICANT CHANGE UP (ref 96–108)
CO2 SERPL-SCNC: 20 MMOL/L — LOW (ref 22–31)
CREAT SERPL-MCNC: 15.12 MG/DL — HIGH (ref 0.5–1.3)
GLUCOSE SERPL-MCNC: 95 MG/DL — SIGNIFICANT CHANGE UP (ref 70–99)
HCT VFR BLD CALC: 30.5 % — LOW (ref 39–50)
HGB BLD-MCNC: 9.7 G/DL — LOW (ref 13–17)
MAGNESIUM SERPL-MCNC: 2.7 MG/DL — HIGH (ref 1.6–2.6)
MCHC RBC-ENTMCNC: 31.1 PG — SIGNIFICANT CHANGE UP (ref 27–34)
MCHC RBC-ENTMCNC: 31.8 GM/DL — LOW (ref 32–36)
MCV RBC AUTO: 97.8 FL — SIGNIFICANT CHANGE UP (ref 80–100)
PHOSPHATE SERPL-MCNC: 8.4 MG/DL — HIGH (ref 2.5–4.5)
PLATELET # BLD AUTO: 202 K/UL — SIGNIFICANT CHANGE UP (ref 150–400)
POTASSIUM SERPL-MCNC: 4.6 MMOL/L — SIGNIFICANT CHANGE UP (ref 3.5–5.3)
POTASSIUM SERPL-SCNC: 4.6 MMOL/L — SIGNIFICANT CHANGE UP (ref 3.5–5.3)
RBC # BLD: 3.12 M/UL — LOW (ref 4.2–5.8)
RBC # FLD: 12.9 % — SIGNIFICANT CHANGE UP (ref 10.3–14.5)
SODIUM SERPL-SCNC: 141 MMOL/L — SIGNIFICANT CHANGE UP (ref 135–145)
WBC # BLD: 6.67 K/UL — SIGNIFICANT CHANGE UP (ref 3.8–10.5)
WBC # FLD AUTO: 6.67 K/UL — SIGNIFICANT CHANGE UP (ref 3.8–10.5)

## 2019-05-20 PROCEDURE — 99261: CPT

## 2019-05-20 PROCEDURE — 96375 TX/PRO/DX INJ NEW DRUG ADDON: CPT

## 2019-05-20 PROCEDURE — 84100 ASSAY OF PHOSPHORUS: CPT

## 2019-05-20 PROCEDURE — 85730 THROMBOPLASTIN TIME PARTIAL: CPT

## 2019-05-20 PROCEDURE — 82330 ASSAY OF CALCIUM: CPT

## 2019-05-20 PROCEDURE — 80048 BASIC METABOLIC PNL TOTAL CA: CPT

## 2019-05-20 PROCEDURE — 84132 ASSAY OF SERUM POTASSIUM: CPT

## 2019-05-20 PROCEDURE — 85027 COMPLETE CBC AUTOMATED: CPT

## 2019-05-20 PROCEDURE — 83605 ASSAY OF LACTIC ACID: CPT

## 2019-05-20 PROCEDURE — 85610 PROTHROMBIN TIME: CPT

## 2019-05-20 PROCEDURE — 96374 THER/PROPH/DIAG INJ IV PUSH: CPT

## 2019-05-20 PROCEDURE — 82435 ASSAY OF BLOOD CHLORIDE: CPT

## 2019-05-20 PROCEDURE — 84295 ASSAY OF SERUM SODIUM: CPT

## 2019-05-20 PROCEDURE — 80053 COMPREHEN METABOLIC PANEL: CPT

## 2019-05-20 PROCEDURE — 83690 ASSAY OF LIPASE: CPT

## 2019-05-20 PROCEDURE — 85014 HEMATOCRIT: CPT

## 2019-05-20 PROCEDURE — 74176 CT ABD & PELVIS W/O CONTRAST: CPT

## 2019-05-20 PROCEDURE — 81001 URINALYSIS AUTO W/SCOPE: CPT

## 2019-05-20 PROCEDURE — 86901 BLOOD TYPING SEROLOGIC RH(D): CPT

## 2019-05-20 PROCEDURE — 83735 ASSAY OF MAGNESIUM: CPT

## 2019-05-20 PROCEDURE — 82947 ASSAY GLUCOSE BLOOD QUANT: CPT

## 2019-05-20 PROCEDURE — 86900 BLOOD TYPING SEROLOGIC ABO: CPT

## 2019-05-20 PROCEDURE — 82803 BLOOD GASES ANY COMBINATION: CPT

## 2019-05-20 PROCEDURE — 86850 RBC ANTIBODY SCREEN: CPT

## 2019-05-20 PROCEDURE — 87086 URINE CULTURE/COLONY COUNT: CPT

## 2019-05-20 PROCEDURE — 99285 EMERGENCY DEPT VISIT HI MDM: CPT | Mod: 25

## 2019-05-20 RX ORDER — METRONIDAZOLE 500 MG
1 TABLET ORAL
Qty: 26 | Refills: 0
Start: 2019-05-20 | End: 2019-06-01

## 2019-05-20 RX ORDER — METRONIDAZOLE 500 MG
1 TABLET ORAL
Qty: 0 | Refills: 0 | DISCHARGE
Start: 2019-05-20

## 2019-05-20 RX ORDER — METRONIDAZOLE 500 MG
1 TABLET ORAL
Qty: 39 | Refills: 0
Start: 2019-05-20 | End: 2019-06-01

## 2019-05-20 RX ORDER — CIPROFLOXACIN LACTATE 400MG/40ML
250 VIAL (ML) INTRAVENOUS DAILY
Refills: 0 | Status: DISCONTINUED | OUTPATIENT
Start: 2019-05-20 | End: 2019-05-20

## 2019-05-20 RX ORDER — CIPROFLOXACIN LACTATE 400MG/40ML
1 VIAL (ML) INTRAVENOUS
Qty: 13 | Refills: 0
Start: 2019-05-20 | End: 2019-06-01

## 2019-05-20 RX ADMIN — HEPARIN SODIUM 5000 UNIT(S): 5000 INJECTION INTRAVENOUS; SUBCUTANEOUS at 05:39

## 2019-05-20 RX ADMIN — CHLORHEXIDINE GLUCONATE 1 APPLICATION(S): 213 SOLUTION TOPICAL at 05:36

## 2019-05-20 RX ADMIN — Medication 500 MILLIGRAM(S): at 17:18

## 2019-05-20 RX ADMIN — Medication 1 MILLIGRAM(S): at 12:31

## 2019-05-20 RX ADMIN — LIDOCAINE AND PRILOCAINE CREAM 1 APPLICATION(S): 25; 25 CREAM TOPICAL at 11:15

## 2019-05-20 RX ADMIN — SEVELAMER CARBONATE 800 MILLIGRAM(S): 2400 POWDER, FOR SUSPENSION ORAL at 07:43

## 2019-05-20 RX ADMIN — Medication 1 TABLET(S): at 11:14

## 2019-05-20 RX ADMIN — SEVELAMER CARBONATE 800 MILLIGRAM(S): 2400 POWDER, FOR SUSPENSION ORAL at 12:33

## 2019-05-20 RX ADMIN — Medication 500 MILLIGRAM(S): at 05:39

## 2019-05-20 RX ADMIN — Medication 250 MILLIGRAM(S): at 17:18

## 2019-05-20 NOTE — PROGRESS NOTE ADULT - ASSESSMENT
33M w/ PMHx of ESRD  due to Lithium use on HD M/W/F, HTN, NE presenting with 1 day abd pain with assoc fevers, chills and diarrhea. Labs demonstrated left shift but no leukocytosis , . CT performed consistent with acute complicated diverticulitis with proximal sigmoid inflammation and collection measuring 2.4 x 0.8 cm in adjacent mesentery,    1- esrd  2- htn   3- complicated diverticulitis  4- shpt     hd revaclear 300 3.5 hr 2 k bath bfr 400 dfr 600 see hd flow sheet    cipro to 250 mg daily given esrd  and flagyl 500 mg po bid
33 m with  Perforated diverticulitis  - antibiotics  - serial abdominal exams  - diet as tolerated.    ESRD  - HD  - Nephrology follow Dr. Cruz    Anxiety control    Medically stable.  d/w patient   Jaun Pretty MD pager [phone]
33 m with  Perforated diverticulitis  - antibiotics  - serial abdominal exams  - diet as tolerated.    ESRD  - HD  - Nephrology follow Dr. Cruz    Anxiety control  d/w patient and father  Jaun Pretty MD pager [phone]
33M w/ PMHx of ESRD  due to Lithium use on HD M/W/F, HTN, NE presenting with 1 day abd pain with assoc fevers, chills and diarrhea. Labs demonstrated left shift but no leukocytosis , . CT performed consistent with acute complicated diverticulitis with proximal sigmoid inflammation and collection measuring 2.4 x 0.8 cm in adjacent mesentery,    1- esrd  2- htn   3- complicated diverticulitis  4- shpt     hd am  change cipro to 250 mg daily given esrd  and flagyl 500 mg po bid  monitor bp
33M with unfortunate hx of CKD on HD  p/w 1 day lower ab pain w/ assoc fever, chills and diarrhea now found to have diverticulitis with small collection in adjacent mesentery.     - Pain control with PO pain meds.   - CLD, ADAT.   - IV Abx  - Consult Nephrology Dr Cruz is his nephrologist, for HD while admitted  - c/w IV equivalents of home meds  - Pain control  - DVT ppx
33M with unfortunate hx of CKD on HD  p/w 1 day lower ab pain w/ assoc fever, chills and diarrhea now found to have diverticulitis with small collection in adjacent mesentery.     - Pain control with PO pain meds.   - reg diet  - PO abx  - c/w IV equivalents of home meds  - Pain control  - DVT ppx  - dispo: home today after AM HD session    p.[phone] ACS

## 2019-05-20 NOTE — PROGRESS NOTE ADULT - SUBJECTIVE AND OBJECTIVE BOX
[facility]   [phone]  RENAL FOLLOW UP NOTE  --------------------------------------------------------------------------------  Chief Complaint:    24 hour events/subjective:    seen   family at bedside   states tolerating food and pain  has dissipated     PAST HISTORY  --------------------------------------------------------------------------------  No significant changes to PMH, PSH, FHx, SHx, unless otherwise noted    ALLERGIES & MEDICATIONS  --------------------------------------------------------------------------------  Allergies    minocycline (Other)  Prozac (Other)  red dye (Unknown)    Intolerances      Standing Inpatient Medications  chlorhexidine 4% Liquid 1 Application(s) Topical <User Schedule>  ciprofloxacin     Tablet 500 milliGRAM(s) Oral every 24 hours  heparin  Injectable 5000 Unit(s) SubCutaneous every 8 hours  metroNIDAZOLE    Tablet 500 milliGRAM(s) Oral every 8 hours  multivitamin/minerals 1 Tablet(s) Oral daily  sevelamer carbonate 800 milliGRAM(s) Oral three times a day with meals    PRN Inpatient Medications  ALPRAZolam 1 milliGRAM(s) Oral at bedtime PRN  HYDROmorphone  Injectable 0.5 milliGRAM(s) IV Push every 4 hours PRN      REVIEW OF SYSTEMS  --------------------------------------------------------------------------------    Gen: denies fevers/chills,  CVS: denies chest pain/palpitations  Resp: denies SOB/Cough  GI: Denies N/V/Abd pain  : Denies dysuria    All other systems were reviewed and are negative, except as noted.    VITALS/PHYSICAL EXAM  --------------------------------------------------------------------------------  T(C): 36.7 (05-19-19 @ 16:58), Max: 37 (05-18-19 @ 18:40)  HR: 90 (05-19-19 @ 16:58) (78 - 98)  BP: 149/77 (05-19-19 @ 16:58) (130/76 - 166/91)  RR: 18 (05-19-19 @ 16:58) (18 - 18)  SpO2: 100% (05-19-19 @ 16:58) (97% - 100%)  Wt(kg): --  Height (cm): 193.04 (05-17-19 @ 21:17)  Weight (kg): 124.3 (05-17-19 @ 21:17)  BMI (kg/m2): 33.4 (05-17-19 @ 21:17)  BSA (m2): 2.53 (05-17-19 @ 21:17)      05-18-19 @ 07:01  -  05-19-19 @ 07:00  --------------------------------------------------------  IN: 1595 mL / OUT: 1600 mL / NET: -5 mL    05-19-19 @ 07:01  -  05-19-19 @ 17:36  --------------------------------------------------------  IN: 700 mL / OUT: 1400 mL / NET: -700 mL      Physical Exam:  	  Gen: Non toxic   	no jvd   	Pulm: decrease bs  no rales or ronchi or wheezing  	CV: RRR, S1S2; no rub  	Abd: +BS, soft, non tender non distended   	: No suprapubic tenderness  	UE: Warm, no cyanosis  no clubbing,  no edema  	LE: Warm, no cyanosis  no clubbing, no edema  	Neuro: alert and oriented. speech coherent   	Psych: anxious  	Skin: Warm, no decrease skin turgor   	Vascular access: + avf + bruit and thrill      LABS/STUDIES  --------------------------------------------------------------------------------              10.1   6.42  >-----------<  176      [05-19-19 @ 09:01]              31.9     139  |  97  |  58  ----------------------------<  80      [05-19-19 @ 06:50]  4.4   |  21  |  13.70        Ca     9.3     [05-19-19 @ 06:50]      Mg     2.6     [05-19-19 @ 06:50]      Phos  8.6     [05-19-19 @ 06:50]    TPro  7.4  /  Alb  4.3  /  TBili  0.6  /  DBili  x   /  AST  16  /  ALT  15  /  AlkPhos  76  [05-18-19 @ 00:01]    PT/INR: PT 13.5 , INR 1.18       [05-18-19 @ 04:52]  PTT: 25.3       [05-18-19 @ 04:52]      Creatinine Trend:  SCr 13.70 [05-19 @ 06:50]  SCr 10.65 [05-18 @ 00:01]              Urinalysis - [05-18-19 @ 00:01]      Color Colorless / Appearance Clear / SG 1.006 / pH 8.5      Gluc 100 mg/dL / Ketone Negative  / Bili Negative / Urobili Negative       Blood Small / Protein 100 mg/dL / Leuk Est Negative / Nitrite Negative      RBC 0 / WBC 1 / Hyaline 0 / Gran  / Sq Epi  / Non Sq Epi 0 / Bacteria Negative
Patient is a 33y old  Male who presents with a chief complaint of diverticulitis (19 May 2019 05:26)      SUBJECTIVE / OVERNIGHT EVENTS: Comfortable without new complaints.   Review of Systems  chest pain no  palpitations no  sob no  nausea no  headache no    MEDICATIONS  (STANDING):  chlorhexidine 4% Liquid 1 Application(s) Topical <User Schedule>  ciprofloxacin     Tablet 500 milliGRAM(s) Oral every 24 hours  heparin  Injectable 5000 Unit(s) SubCutaneous every 8 hours  metroNIDAZOLE    Tablet 500 milliGRAM(s) Oral every 8 hours  multivitamin/minerals 1 Tablet(s) Oral daily  sevelamer carbonate 800 milliGRAM(s) Oral three times a day with meals    MEDICATIONS  (PRN):  ALPRAZolam 1 milliGRAM(s) Oral at bedtime PRN anxiety  HYDROmorphone  Injectable 0.5 milliGRAM(s) IV Push every 4 hours PRN Severe Pain (7 - 10)      Vital Signs Last 24 Hrs  T(C): 36.9 (19 May 2019 13:51), Max: 37.6 (18 May 2019 14:42)  T(F): 98.5 (19 May 2019 13:51), Max: 99.6 (18 May 2019 14:42)  HR: 91 (19 May 2019 13:51) (78 - 98)  BP: 162/91 (19 May 2019 13:51) (130/76 - 166/91)  BP(mean): --  RR: 18 (19 May 2019 13:51) (17 - 18)  SpO2: 100% (19 May 2019 13:51) (96% - 100%)    PHYSICAL EXAM:  GENERAL: NAD, well-developed  HEAD:  Atraumatic, Normocephalic  EYES: EOMI, PERRLA, conjunctiva and sclera clear  NECK: Supple, No JVD  CHEST/LUNG: Clear to auscultation bilaterally; No wheeze  HEART: Regular rate and rhythm; No murmurs, rubs, or gallops  ABDOMEN: Soft, Nontender, Nondistended; Bowel sounds present  EXTREMITIES:  2+ Peripheral Pulses, No clubbing, cyanosis, or edema  PSYCH: AAOx3  NEUROLOGY: non-focal  SKIN: No rashes or lesions    LABS:                        10.1   6.42  )-----------( 176      ( 19 May 2019 09:01 )             31.9     05-19    139  |  97  |  58<H>  ----------------------------<  80  4.4   |  21<L>  |  13.70<H>    Ca    9.3      19 May 2019 06:50  Phos  8.6       Mg     2.6         TPro  7.4  /  Alb  4.3  /  TBili  0.6  /  DBili  x   /  AST  16  /  ALT  15  /  AlkPhos  76      PT/INR - ( 18 May 2019 04:52 )   PT: 13.5 sec;   INR: 1.18 ratio         PTT - ( 18 May 2019 04:52 )  PTT:25.3 sec      Urinalysis Basic - ( 18 May 2019 00:01 )    Color: Colorless / Appearance: Clear / S.006 / pH: x  Gluc: x / Ketone: Negative  / Bili: Negative / Urobili: Negative   Blood: x / Protein: 100 mg/dL / Nitrite: Negative   Leuk Esterase: Negative / RBC: 0 /hpf / WBC 1 /HPF   Sq Epi: x / Non Sq Epi: 0 /hpf / Bacteria: Negative        Culture - Urine (collected 18 May 2019 01:29)  Source: .Urine  Final Report (18 May 2019 20:50):    No growth        RADIOLOGY & ADDITIONAL TESTS:    Imaging Personally Reviewed:    Consultant(s) Notes Reviewed:      Care Discussed with Consultants/Other Providers:
Patient is a 33y old  Male who presents with a chief complaint of diverticulitis (20 May 2019 17:19)      SUBJECTIVE / OVERNIGHT EVENTS: Comfortable without new complaints.   Review of Systems  chest pain no  palpitations no  sob no  nausea no  headache no    MEDICATIONS  (STANDING):  chlorhexidine 4% Liquid 1 Application(s) Topical <User Schedule>  ciprofloxacin     Tablet 250 milliGRAM(s) Oral daily  heparin  Injectable 5000 Unit(s) SubCutaneous every 8 hours  metroNIDAZOLE    Tablet 500 milliGRAM(s) Oral every 8 hours  multivitamin/minerals 1 Tablet(s) Oral daily  sevelamer carbonate 800 milliGRAM(s) Oral three times a day with meals    MEDICATIONS  (PRN):  ALPRAZolam 1 milliGRAM(s) Oral at bedtime PRN anxiety  HYDROmorphone  Injectable 0.5 milliGRAM(s) IV Push every 4 hours PRN Severe Pain (7 - 10)  lidocaine/prilocaine Cream 1 Application(s) Topical daily PRN 30 minutes prior to HD      Vital Signs Last 24 Hrs  T(C): 37.2 (20 May 2019 17:18), Max: 37.2 (20 May 2019 17:18)  T(F): 99 (20 May 2019 17:18), Max: 99 (20 May 2019 17:18)  HR: 93 (20 May 2019 17:18) (82 - 103)  BP: 156/92 (20 May 2019 17:18) (144/83 - 165/80)  BP(mean): --  RR: 18 (20 May 2019 17:18) (17 - 18)  SpO2: 98% (20 May 2019 17:18) (97% - 100%)    PHYSICAL EXAM:  GENERAL: NAD, well-developed  HEAD:  Atraumatic, Normocephalic  EYES: EOMI, PERRLA, conjunctiva and sclera clear  NECK: Supple, No JVD  CHEST/LUNG: Clear to auscultation bilaterally; No wheeze  HEART: Regular rate and rhythm; No murmurs, rubs, or gallops  ABDOMEN: Soft, Nontender, Nondistended; Bowel sounds present  EXTREMITIES:  2+ Peripheral Pulses, No clubbing, cyanosis, or edema  PSYCH: AAOx3  NEUROLOGY: non-focal  SKIN: No rashes or lesions    LABS:                        9.7    6.67  )-----------( 202      ( 20 May 2019 09:20 )             30.5     05-20    141  |  99  |  76<H>  ----------------------------<  95  4.6   |  20<L>  |  15.12<H>    Ca    9.3      20 May 2019 07:06  Phos  8.4     05-20  Mg     2.7     05-20                Culture - Urine (collected 18 May 2019 01:29)  Source: .Urine  Final Report (18 May 2019 20:50):    No growth        RADIOLOGY & ADDITIONAL TESTS:    Imaging Personally Reviewed:    Consultant(s) Notes Reviewed:      Care Discussed with Consultants/Other Providers:
Surgery Progress Note     S: Patient seen and examined today. No acute events overnight. Pain is well controlled. Denies f/c, chest pain, shortness of breath, dizziness.    MEDICATIONS  (STANDING):  chlorhexidine 4% Liquid 1 Application(s) Topical <User Schedule>  ciprofloxacin     Tablet 500 milliGRAM(s) Oral every 24 hours  heparin  Injectable 5000 Unit(s) SubCutaneous every 8 hours  metroNIDAZOLE    Tablet 500 milliGRAM(s) Oral every 8 hours  multivitamin/minerals 1 Tablet(s) Oral daily  sevelamer carbonate 800 milliGRAM(s) Oral three times a day with meals    MEDICATIONS  (PRN):  ALPRAZolam 1 milliGRAM(s) Oral at bedtime PRN anxiety  HYDROmorphone  Injectable 0.5 milliGRAM(s) IV Push every 4 hours PRN Severe Pain (7 - 10)  lidocaine/prilocaine Cream 1 Application(s) Topical daily PRN 30 minutes prior to HD      Physical Exam:    Vital Signs Last 24 Hrs  T(C): 36.4 (20 May 2019 05:44), Max: 36.9 (19 May 2019 13:51)  T(F): 97.6 (20 May 2019 05:44), Max: 98.5 (19 May 2019 13:51)  HR: 82 (20 May 2019 05:44) (82 - 91)  BP: 160/92 (20 May 2019 05:44) (144/83 - 164/91)  BP(mean): --  RR: 18 (20 May 2019 05:44) (17 - 18)  SpO2: 97% (20 May 2019 05:44) (97% - 100%)    05-19-19 @ 07:01  -  05-20-19 @ 07:00  --------------------------------------------------------  IN: 900 mL / OUT: 2900 mL / NET: -2000 mL          Gen: NAD.  Lungs: Non labored breathing.   Ab: Soft, nontender, nondistended.   Ext: Moves all 4 spontaneously.           LABS:                        10.1   6.42  )-----------( 176      ( 19 May 2019 09:01 )             31.9     05-20    141  |  99  |  76<H>  ----------------------------<  95  4.6   |  20<L>  |  15.12<H>    Ca    9.3      20 May 2019 07:06  Phos  8.4     05-20  Mg     2.7     05-20
Surgery Progress Note     Subjective/24hour Events:   Patient seen and examined.'  Yesterday tolerated CLD, no increased pain, no N/V.   Out of bed and ambulating.    No acute events overnight.   Pain controlled.   Remains afebrile, hemodynamically stable.     Vital Signs:  Vital Signs Last 24 Hrs  T(C): 36.4 (19 May 2019 05:15), Max: 37.6 (18 May 2019 14:42)  T(F): 97.6 (19 May 2019 05:15), Max: 99.6 (18 May 2019 14:42)  HR: 78 (19 May 2019 05:15) (78 - 98)  BP: 166/91 (19 May 2019 05:15) (129/79 - 166/91)  BP(mean): --  RR: 18 (19 May 2019 05:15) (16 - 18)  SpO2: 99% (19 May 2019 05:15) (95% - 99%)    CAPILLARY BLOOD GLUCOSE          I&O's Detail    18 May 2019 07:01  -  19 May 2019 05:26  --------------------------------------------------------  IN:    lactated ringers.: 495 mL    Oral Fluid: 240 mL    Solution: 200 mL  Total IN: 935 mL    OUT:    Voided: 1600 mL  Total OUT: 1600 mL    Total NET: -665 mL          MEDICATIONS  (STANDING):  chlorhexidine 4% Liquid 1 Application(s) Topical <User Schedule>  ciprofloxacin   IVPB 400 milliGRAM(s) IV Intermittent daily  heparin  Injectable 5000 Unit(s) SubCutaneous every 8 hours  lactated ringers. 1000 milliLiter(s) (30 mL/Hr) IV Continuous <Continuous>  metroNIDAZOLE  IVPB 500 milliGRAM(s) IV Intermittent every 8 hours  multivitamin/minerals 1 Tablet(s) Oral daily  sevelamer carbonate 800 milliGRAM(s) Oral three times a day with meals    MEDICATIONS  (PRN):  ALPRAZolam 1 milliGRAM(s) Oral at bedtime PRN anxiety  HYDROmorphone  Injectable 0.5 milliGRAM(s) IV Push every 4 hours PRN Severe Pain (7 - 10)      Physical Exam:  Gen: NAD.  Lungs: Non labored breathing.   Ab: Soft, nontender, nondistended.   Ext: Moves all 4 spontaneously.     Labs:    05-18    138  |  94<L>  |  44<H>  ----------------------------<  119<H>  3.9   |  27  |  10.65<H>    Ca    9.6      18 May 2019 00:01    TPro  7.4  /  Alb  4.3  /  TBili  0.6  /  DBili  x   /  AST  16  /  ALT  15  /  AlkPhos  76  05-18    LIVER FUNCTIONS - ( 18 May 2019 00:01 )  Alb: 4.3 g/dL / Pro: 7.4 g/dL / ALK PHOS: 76 U/L / ALT: 15 U/L / AST: 16 U/L / GGT: x                                 11.5   10.5  )-----------( 187      ( 18 May 2019 00:01 )             33.8     PT/INR - ( 18 May 2019 04:52 )   PT: 13.5 sec;   INR: 1.18 ratio         PTT - ( 18 May 2019 04:52 )  PTT:25.3 sec
[facility]   [phone]  DIALYSIS NOTE  Chief Complaint: ESRD/Ongoing hemodialysis requirement. seen on hd and earlier     24 hour events/subjective:    states feels better.    tolerating po       ALLERGIES & MEDICATIONS  --------------------------------------------------------------------------------  Allergies    minocycline (Other)  Prozac (Other)  red dye (Unknown)    Intolerances      Standing Inpatient Medications  chlorhexidine 4% Liquid 1 Application(s) Topical <User Schedule>  ciprofloxacin     Tablet 250 milliGRAM(s) Oral daily  heparin  Injectable 5000 Unit(s) SubCutaneous every 8 hours  metroNIDAZOLE    Tablet 500 milliGRAM(s) Oral every 8 hours  multivitamin/minerals 1 Tablet(s) Oral daily  sevelamer carbonate 800 milliGRAM(s) Oral three times a day with meals    PRN Inpatient Medications  ALPRAZolam 1 milliGRAM(s) Oral at bedtime PRN  HYDROmorphone  Injectable 0.5 milliGRAM(s) IV Push every 4 hours PRN  lidocaine/prilocaine Cream 1 Application(s) Topical daily PRN      REVIEW OF SYSTEMS  --------------------------------------------------------------------------------  no itching or rash  no fever or chill  no cp or palp   no sob or cough   no N/V/D/ no abd pain   ext no edema        VITALS/PHYSICAL EXAM  --------------------------------------------------------------------------------  T(C): 36.7 (05-20-19 @ 13:00), Max: 37.1 (05-20-19 @ 09:32)  HR: 94 (05-20-19 @ 13:00) (82 - 99)  BP: 163/101 (05-20-19 @ 13:00) (144/83 - 165/80)  RR: 17 (05-20-19 @ 13:00) (17 - 18)  SpO2: 99% (05-20-19 @ 13:00) (97% - 99%)  Wt(kg): --        05-19-19 @ 07:01  -  05-20-19 @ 07:00  --------------------------------------------------------  IN: 900 mL / OUT: 2900 mL / NET: -2000 mL    05-20-19 @ 07:01  -  05-20-19 @ 17:19  --------------------------------------------------------  IN: 440 mL / OUT: 550 mL / NET: -110 mL      Physical Exam:  		    	Gen: alert oriented place person and date   	Pulm: Decreased breath sounds b/l bases no rales or ronchi  	CV: RRR, S1/S2  	Abd: +BS, soft, nontender/nondistended  	Extremity: No cyanosis, no edema no clubbing      LABS/STUDIES  --------------------------------------------------------------------------------              9.7    6.67  >-----------<  202      [05-20-19 @ 09:20]              30.5     141  |  99  |  76  ----------------------------<  95      [05-20-19 @ 07:06]  4.6   |  20  |  15.12        Ca     9.3     [05-20-19 @ 07:06]      Mg     2.7     [05-20-19 @ 07:06]      Phos  8.4     [05-20-19 @ 07:06]                    imp/suggest: ESRD      Hemodialysis Prescription:  	Access:  	Dialyzer: revaclear   	Blood Flow (mL/Min): 400  	Dialysate Flow (mL/Min): 600  	Target UF (Liters):  	Treatment Time:  	Potassium:   	Calcium: 2.5  	  MARSHAL    Vitamin D     continue with hd   see hd flow sheet

## 2019-05-20 NOTE — DISCHARGE NOTE NURSING/CASE MANAGEMENT/SOCIAL WORK - NSDCDPATPORTLINK_GEN_ALL_CORE
You can access the [facility] Portal, offered by [facility], by registering with the following website: [email or web address]

## 2019-05-22 PROBLEM — N18.6 END STAGE RENAL DISEASE: Chronic | Status: ACTIVE | Noted: 2019-05-18

## 2019-05-29 ENCOUNTER — APPOINTMENT (OUTPATIENT)
Dept: COLORECTAL SURGERY | Facility: CLINIC | Age: 34
End: 2019-05-29
Payer: MEDICARE

## 2019-05-29 VITALS
HEIGHT: 76 IN | HEART RATE: 107 BPM | RESPIRATION RATE: 14 BRPM | DIASTOLIC BLOOD PRESSURE: 89 MMHG | SYSTOLIC BLOOD PRESSURE: 133 MMHG | OXYGEN SATURATION: 98 % | WEIGHT: 267 LBS | BODY MASS INDEX: 32.51 KG/M2

## 2019-05-29 DIAGNOSIS — K57.92 DIVERTICULITIS OF INTESTINE, PART UNSPECIFIED, W/OUT PERFORATION OR ABSCESS W/OUT BLEEDING: ICD-10-CM

## 2019-05-29 DIAGNOSIS — Z82.49 FAMILY HISTORY OF ISCHEMIC HEART DISEASE AND OTHER DISEASES OF THE CIRCULATORY SYSTEM: ICD-10-CM

## 2019-05-29 DIAGNOSIS — Z99.2 DEPENDENCE ON RENAL DIALYSIS: ICD-10-CM

## 2019-05-29 DIAGNOSIS — Z87.448 PERSONAL HISTORY OF OTHER DISEASES OF URINARY SYSTEM: ICD-10-CM

## 2019-05-29 DIAGNOSIS — Z80.8 FAMILY HISTORY OF MALIGNANT NEOPLASM OF OTHER ORGANS OR SYSTEMS: ICD-10-CM

## 2019-05-29 DIAGNOSIS — Z87.891 PERSONAL HISTORY OF NICOTINE DEPENDENCE: ICD-10-CM

## 2019-05-29 PROCEDURE — 46600 DIAGNOSTIC ANOSCOPY SPX: CPT

## 2019-05-29 PROCEDURE — 99205 OFFICE O/P NEW HI 60 MIN: CPT

## 2019-05-29 RX ORDER — CIPROFLOXACIN HYDROCHLORIDE 250 MG/1
250 TABLET, FILM COATED ORAL
Refills: 0 | Status: ACTIVE | COMMUNITY

## 2019-05-29 RX ORDER — ALPRAZOLAM 1 MG/1
1 TABLET ORAL
Refills: 0 | Status: ACTIVE | COMMUNITY

## 2019-05-29 RX ORDER — METRONIDAZOLE 500 MG/1
500 TABLET ORAL
Refills: 0 | Status: ACTIVE | COMMUNITY

## 2019-05-29 RX ORDER — NEOMYCIN SULFATE 500 MG/1
500 TABLET ORAL
Qty: 6 | Refills: 0 | Status: ACTIVE | COMMUNITY
Start: 2019-05-29 | End: 1900-01-01

## 2019-05-29 RX ORDER — METRONIDAZOLE 500 MG/1
500 TABLET ORAL 3 TIMES DAILY
Qty: 3 | Refills: 1 | Status: ACTIVE | COMMUNITY
Start: 2019-05-29 | End: 1900-01-01

## 2019-05-29 RX ORDER — POLYETHYLENE GLYCOL 3350, SODIUM SULFATE ANHYDROUS, SODIUM BICARBONATE, SODIUM CHLORIDE, POTASSIUM CHLORIDE 227.1; 21.5; 6.36; 5.53; .754 G/L; G/L; G/L; G/L; G/L
227.1 POWDER, FOR SOLUTION ORAL
Qty: 1 | Refills: 0 | Status: ACTIVE | COMMUNITY
Start: 2019-05-29 | End: 1900-01-01

## 2019-05-29 RX ORDER — ASCORBIC ACID, THIAMINE, RIBOFLAVIN, NIACINAMIDE, PYRIDOXINE, FOLIC ACID, COBALAMIN, BIOTIN, PANTOTHENIC ACID 100; 1.5; 1.7; 20; 10; 1; 6; 300; 1 MG/1; MG/1; MG/1; MG/1; MG/1; MG/1; UG/1; UG/1; MG/1
TABLET, COATED ORAL
Refills: 0 | Status: ACTIVE | COMMUNITY

## 2019-05-29 RX ORDER — LIDOCAINE AND PRILOCAINE 25; 25 MG/G; MG/G
2.5-2.5 CREAM TOPICAL
Qty: 60 | Refills: 0 | Status: ACTIVE | COMMUNITY
Start: 2018-03-02

## 2019-05-29 RX ORDER — SEVELAMER CARBONATE 800 MG/1
800 TABLET, FILM COATED ORAL
Refills: 0 | Status: ACTIVE | COMMUNITY

## 2019-05-29 NOTE — REVIEW OF SYSTEMS
[As Noted in HPI] : as noted in HPI [Anxiety] : anxiety [Negative] : Eyes [FreeTextEntry3] : wears glasses [FreeTextEntry5] : HTN

## 2019-05-29 NOTE — ASSESSMENT
[FreeTextEntry1] : 33-year-old male endstage renal disease on dialysis and recent history of complicated diverticulitis which resolved with antibiotics in the hospital presents for evaluation.  I explained to the patient that he does require surgery as he suffered from complicated diverticulitis which was contained in the patient's left hemiabdomen.  \par \par I had an extensive discussion with the patient regarding the risks and benefits of an anterior resection/left colectomy.\par The risks discussed include but are not limited to, bleeding, ureteral injury, injury to other organs or blood vessels in the abdomen, delayed perforation secondary to a thermal injury.  thromboembolic events, wound infection, and anastomotic leak which may require emergent surgery and creation of a colostomy, as well as sexual and urinary complications from nerve injury.  Furthermore, the post-operative requirements for discharge were discussed which are passing flatus (bowel movement is not necessary), tolerating solid food, and adequate pain control without intravenous medication.\par  NASAL DRAINAGE/ACTING DIFFERENTLY/CONGESTION/COUGH/FEVER

## 2019-05-29 NOTE — PHYSICAL EXAM
[Normal Breath Sounds] : Normal breath sounds [Normal Heart Sounds] : normal heart sounds [Alert] : alert [No Rash or Lesion] : No rash or lesion [Oriented to Person] : oriented to person [Oriented to Place] : oriented to place [Oriented to Time] : oriented to time [Calm] : calm [Normal rectal exam] : exam was normal [Normal] : was normal [Abdomen Masses] : No abdominal masses [Tender] : nontender [Excoriation] : no perianal excoriation [Wart] : no warts [Multiple Sinus Tracts] : no perianal sinus tracts [Fistula] : no fistulas [Ulcer ___ cm] : no ulcers [Pilonidal Cyst] : no pilonidal cysts [Pilonidal Sinus] : no pilonidal sinus [Pilonidal Sinus Draining] : no pilonidal sinus drainage [Thrombosed] : that was not thrombosed [Tender, Swollen] : nontender, non-swollen [JVD] : no jugular venous distention  [Skin Tags] : there were no residual hemorrhoidal skin tags seen [Purpura] : no purpura  [Skin Ulcer] : no ulcer [Petechiae] : no petechiae [de-identified] : round, NT/ND, +BS [de-identified] : anoscopy [Skin Induration] : no induration [de-identified] : healed posterior fissure [de-identified] : well nourished male [de-identified] : NC/AT [FreeTextEntry1] : L. arm A-V fistula [de-identified] : Tachycardia [de-identified] : TALISHA/+ROM [de-identified] : Intact

## 2019-05-29 NOTE — HISTORY OF PRESENT ILLNESS
[FreeTextEntry1] : Patient is a 34 yo WM here after being in the hospital for diverticulitis with perforation.  He states that this is the first time it happens.  He was admitted with IV antibiotics and discharged on oral antibiotics.  He currently feels better.  He is having multiple daily bowel movements usually soft.  He is on dialysis for ESRD.\par \par Had a colonoscopy 7 years ago (normal)

## 2019-06-03 ENCOUNTER — EMERGENCY (EMERGENCY)
Facility: HOSPITAL | Age: 34
LOS: 1 days | Discharge: HOME | End: 2019-06-03
Attending: EMERGENCY MEDICINE
Payer: MEDICARE

## 2019-06-03 VITALS
RESPIRATION RATE: 18 BRPM | OXYGEN SATURATION: 99 % | DIASTOLIC BLOOD PRESSURE: 82 MMHG | SYSTOLIC BLOOD PRESSURE: 139 MMHG | HEART RATE: 111 BPM | HEIGHT: 76 IN | WEIGHT: 263.89 LBS | TEMPERATURE: 101 F

## 2019-06-03 VITALS
SYSTOLIC BLOOD PRESSURE: 107 MMHG | RESPIRATION RATE: 17 BRPM | TEMPERATURE: 99 F | HEART RATE: 89 BPM | OXYGEN SATURATION: 97 % | DIASTOLIC BLOOD PRESSURE: 91 MMHG

## 2019-06-03 LAB
ALBUMIN SERPL ELPH-MCNC: 3.9 G/DL — SIGNIFICANT CHANGE UP (ref 3.3–5)
ALP SERPL-CCNC: 57 U/L — SIGNIFICANT CHANGE UP (ref 40–120)
ALT FLD-CCNC: 22 U/L — SIGNIFICANT CHANGE UP (ref 10–45)
ANION GAP SERPL CALC-SCNC: 15 MMOL/L — SIGNIFICANT CHANGE UP (ref 5–17)
APPEARANCE UR: CLEAR — SIGNIFICANT CHANGE UP
APTT BLD: 27 SEC — LOW (ref 27.5–36.3)
AST SERPL-CCNC: 16 U/L — SIGNIFICANT CHANGE UP (ref 10–40)
BASOPHILS # BLD AUTO: 0 K/UL — SIGNIFICANT CHANGE UP (ref 0–0.2)
BASOPHILS NFR BLD AUTO: 0.3 % — SIGNIFICANT CHANGE UP (ref 0–2)
BILIRUB SERPL-MCNC: 0.4 MG/DL — SIGNIFICANT CHANGE UP (ref 0.2–1.2)
BILIRUB UR-MCNC: NEGATIVE — SIGNIFICANT CHANGE UP
BUN SERPL-MCNC: 35 MG/DL — HIGH (ref 7–23)
CALCIUM SERPL-MCNC: 8.6 MG/DL — SIGNIFICANT CHANGE UP (ref 8.4–10.5)
CHLORIDE SERPL-SCNC: 91 MMOL/L — LOW (ref 96–108)
CO2 SERPL-SCNC: 28 MMOL/L — SIGNIFICANT CHANGE UP (ref 22–31)
COLOR SPEC: COLORLESS — SIGNIFICANT CHANGE UP
CREAT SERPL-MCNC: 9.56 MG/DL — HIGH (ref 0.5–1.3)
DIFF PNL FLD: ABNORMAL
EOSINOPHIL # BLD AUTO: 0.1 K/UL — SIGNIFICANT CHANGE UP (ref 0–0.5)
EOSINOPHIL NFR BLD AUTO: 1.8 % — SIGNIFICANT CHANGE UP (ref 0–6)
GAS PNL BLDV: SIGNIFICANT CHANGE UP
GLUCOSE SERPL-MCNC: 148 MG/DL — HIGH (ref 70–99)
GLUCOSE UR QL: ABNORMAL
HCT VFR BLD CALC: 29.1 % — LOW (ref 39–50)
HGB BLD-MCNC: 10.2 G/DL — LOW (ref 13–17)
INR BLD: 1.23 RATIO — HIGH (ref 0.88–1.16)
KETONES UR-MCNC: NEGATIVE — SIGNIFICANT CHANGE UP
LEUKOCYTE ESTERASE UR-ACNC: NEGATIVE — SIGNIFICANT CHANGE UP
LYMPHOCYTES # BLD AUTO: 1.1 K/UL — SIGNIFICANT CHANGE UP (ref 1–3.3)
LYMPHOCYTES # BLD AUTO: 19.9 % — SIGNIFICANT CHANGE UP (ref 13–44)
MCHC RBC-ENTMCNC: 33.1 PG — SIGNIFICANT CHANGE UP (ref 27–34)
MCHC RBC-ENTMCNC: 34.9 GM/DL — SIGNIFICANT CHANGE UP (ref 32–36)
MCV RBC AUTO: 94.7 FL — SIGNIFICANT CHANGE UP (ref 80–100)
MONOCYTES # BLD AUTO: 0.7 K/UL — SIGNIFICANT CHANGE UP (ref 0–0.9)
MONOCYTES NFR BLD AUTO: 12.6 % — SIGNIFICANT CHANGE UP (ref 2–14)
NEUTROPHILS # BLD AUTO: 3.5 K/UL — SIGNIFICANT CHANGE UP (ref 1.8–7.4)
NEUTROPHILS NFR BLD AUTO: 65.4 % — SIGNIFICANT CHANGE UP (ref 43–77)
NITRITE UR-MCNC: NEGATIVE — SIGNIFICANT CHANGE UP
PH UR: 8.5 — HIGH (ref 5–8)
PLATELET # BLD AUTO: 200 K/UL — SIGNIFICANT CHANGE UP (ref 150–400)
POTASSIUM SERPL-MCNC: 3.4 MMOL/L — LOW (ref 3.5–5.3)
POTASSIUM SERPL-SCNC: 3.4 MMOL/L — LOW (ref 3.5–5.3)
PROT SERPL-MCNC: 6.7 G/DL — SIGNIFICANT CHANGE UP (ref 6–8.3)
PROT UR-MCNC: 100 — SIGNIFICANT CHANGE UP
PROTHROM AB SERPL-ACNC: 14.1 SEC — HIGH (ref 10–12.9)
RAPID RVP RESULT: SIGNIFICANT CHANGE UP
RBC # BLD: 3.07 M/UL — LOW (ref 4.2–5.8)
RBC # FLD: 13.1 % — SIGNIFICANT CHANGE UP (ref 10.3–14.5)
SODIUM SERPL-SCNC: 134 MMOL/L — LOW (ref 135–145)
SP GR SPEC: 1.01 — LOW (ref 1.01–1.02)
UROBILINOGEN FLD QL: NEGATIVE — SIGNIFICANT CHANGE UP
WBC # BLD: 5.3 K/UL — SIGNIFICANT CHANGE UP (ref 3.8–10.5)
WBC # FLD AUTO: 5.3 K/UL — SIGNIFICANT CHANGE UP (ref 3.8–10.5)

## 2019-06-03 PROCEDURE — 87486 CHLMYD PNEUM DNA AMP PROBE: CPT

## 2019-06-03 PROCEDURE — 87040 BLOOD CULTURE FOR BACTERIA: CPT

## 2019-06-03 PROCEDURE — 87086 URINE CULTURE/COLONY COUNT: CPT

## 2019-06-03 PROCEDURE — 85014 HEMATOCRIT: CPT

## 2019-06-03 PROCEDURE — 85027 COMPLETE CBC AUTOMATED: CPT

## 2019-06-03 PROCEDURE — 87633 RESP VIRUS 12-25 TARGETS: CPT

## 2019-06-03 PROCEDURE — 82947 ASSAY GLUCOSE BLOOD QUANT: CPT

## 2019-06-03 PROCEDURE — 82330 ASSAY OF CALCIUM: CPT

## 2019-06-03 PROCEDURE — 83605 ASSAY OF LACTIC ACID: CPT

## 2019-06-03 PROCEDURE — 87798 DETECT AGENT NOS DNA AMP: CPT

## 2019-06-03 PROCEDURE — 71046 X-RAY EXAM CHEST 2 VIEWS: CPT | Mod: 26

## 2019-06-03 PROCEDURE — 82803 BLOOD GASES ANY COMBINATION: CPT

## 2019-06-03 PROCEDURE — 99284 EMERGENCY DEPT VISIT MOD MDM: CPT | Mod: GC

## 2019-06-03 PROCEDURE — 74176 CT ABD & PELVIS W/O CONTRAST: CPT

## 2019-06-03 PROCEDURE — 82435 ASSAY OF BLOOD CHLORIDE: CPT

## 2019-06-03 PROCEDURE — 85730 THROMBOPLASTIN TIME PARTIAL: CPT

## 2019-06-03 PROCEDURE — 87581 M.PNEUMON DNA AMP PROBE: CPT

## 2019-06-03 PROCEDURE — 80053 COMPREHEN METABOLIC PANEL: CPT

## 2019-06-03 PROCEDURE — 96374 THER/PROPH/DIAG INJ IV PUSH: CPT

## 2019-06-03 PROCEDURE — 85610 PROTHROMBIN TIME: CPT

## 2019-06-03 PROCEDURE — 84295 ASSAY OF SERUM SODIUM: CPT

## 2019-06-03 PROCEDURE — 93005 ELECTROCARDIOGRAM TRACING: CPT

## 2019-06-03 PROCEDURE — 71046 X-RAY EXAM CHEST 2 VIEWS: CPT

## 2019-06-03 PROCEDURE — 99284 EMERGENCY DEPT VISIT MOD MDM: CPT | Mod: 25

## 2019-06-03 PROCEDURE — 96375 TX/PRO/DX INJ NEW DRUG ADDON: CPT

## 2019-06-03 PROCEDURE — 81001 URINALYSIS AUTO W/SCOPE: CPT

## 2019-06-03 PROCEDURE — 74176 CT ABD & PELVIS W/O CONTRAST: CPT | Mod: 26

## 2019-06-03 PROCEDURE — 84132 ASSAY OF SERUM POTASSIUM: CPT

## 2019-06-03 RX ORDER — CIPROFLOXACIN LACTATE 400MG/40ML
400 VIAL (ML) INTRAVENOUS ONCE
Refills: 0 | Status: COMPLETED | OUTPATIENT
Start: 2019-06-03 | End: 2019-06-03

## 2019-06-03 RX ORDER — METRONIDAZOLE 500 MG
1 TABLET ORAL
Qty: 21 | Refills: 0
Start: 2019-06-03 | End: 2019-06-09

## 2019-06-03 RX ORDER — MOXIFLOXACIN HYDROCHLORIDE TABLETS, 400 MG 400 MG/1
1 TABLET, FILM COATED ORAL
Qty: 20 | Refills: 0
Start: 2019-06-03 | End: 2019-06-12

## 2019-06-03 RX ORDER — METRONIDAZOLE 500 MG
500 TABLET ORAL ONCE
Refills: 0 | Status: COMPLETED | OUTPATIENT
Start: 2019-06-03 | End: 2019-06-03

## 2019-06-03 RX ADMIN — Medication 200 MILLIGRAM(S): at 22:35

## 2019-06-03 RX ADMIN — Medication 100 MILLIGRAM(S): at 21:38

## 2019-06-03 NOTE — ED PROVIDER NOTE - CLINICAL SUMMARY MEDICAL DECISION MAKING FREE TEXT BOX
33M presenting with fever. no nausea, vomiting, abdominal pain, pain or burning with urination. no clear source for fever but patient is high risk 2/2 dialysis, recent hospitalization with perforated diverticulitis. plan for labs, will withhold antibiotics and fluids for now. will reassess need for imaging. 33M presenting with fever. no nausea, vomiting, abdominal pain, pain or burning with urination. no clear source for fever but patient is high risk 2/2 dialysis, recent hospitalization with perforated diverticulitis. plan for labs, will withhold antibiotics and fluids for now. will reassess need for imaging.  ATTG: Dr. Long

## 2019-06-03 NOTE — ED PROVIDER NOTE - PROGRESS NOTE DETAILS
Ct showing no acute change in known diverticulitis, RVP (-), unclear if divertic is etiology of fever, but pt afebrile for several hours, continues to have no complaints, is amenable to going home with PO antibx to cover divertic and return precautions -Mya, pgy2

## 2019-06-03 NOTE — ED PROVIDER NOTE - NSFOLLOWUPINSTRUCTIONS_ED_ALL_ED_FT
Please follow up with your doctors at your next scheduled appointment     Take the antibiotics we are sending to cover for additional flare up of your diverticulitis     Return to the ER if you have worsening fevers or confusion or if you develop any new symptoms

## 2019-06-03 NOTE — ED PROVIDER NOTE - OBJECTIVE STATEMENT
33M, pmh of ESRD (MWF) , anxiety presenting with fever. Patient reports intermittent fevers for the past two days with a max of 102. Completed dialysis today but was found to be febrile. Was recnetly admitted for diverticulitis and completed a course of cipro and flagyl. Denies any cough, chest pain, difficulty breathing, abdominal pain, nausea, vomiting, diarrhea, rashes or recent travel. 33M, pmh of ESRD (MWF) , anxiety presenting with fever. Patient reports intermittent fevers for the past two days with a max of 102. Completed dialysis today but was found to be febrile. Was recently admitted for diverticulitis and completed a course of cipro and flagyl. Denies any cough, chest pain, difficulty breathing, abdominal pain, nausea, vomiting, diarrhea, rashes or recent travel.

## 2019-06-03 NOTE — ED ADULT NURSE REASSESSMENT NOTE - NS ED NURSE REASSESS COMMENT FT1
Patient received from ALMA DELIA Kennedy. Patient a&ox3, no acute distress, resp nonlabored, resting comfortably in bed with family at bedside.  Denies headache, dizziness, chest pain, palpitations, SOB, abd pain, N/V/D, urinary symptoms, fevers, chills, weakness at this time. Patient awaiting CT results. Pt placed in position of comfort. Pt educated on call bell system and provided call bell. Bed in lowest position, wheels locked, appropriate side rails raised. Pt denies needs at this time.
Report received from ALMA DELIA Mims

## 2019-06-03 NOTE — CONSULT NOTE ADULT - ASSESSMENT
32 yo M with hx of Li use/HTN ---> ESRD, on dialysis. recently admitted with diverticulitis, treated with abx cipro. 2 d pta developed low grade fever. now PTA temp was 102.5F    1- esrd  2- htn   3- diverticulitis recently   4- fevers     hd consent obtained witnessed and placed in chart  viral swab  bcx and ucx   ct abd /pelvis   abx for diverticulitis   pt has refused anti-htn in past

## 2019-06-03 NOTE — ED PROVIDER NOTE - PHYSICAL EXAMINATION
General: well appearing male, no acute distress   HEENT: normocephalic, atraumatic   Respiratory: normal work of breathing, lungs clear to auscultation bilaterally   Cardiac: tachycardic, regular rhythm  Abdomen: soft, non-tender, no cva tenderness   MSk: no swelling or tenderness of lower extremities   Skin: no rashes   Neuro: A&Ox3

## 2019-06-03 NOTE — CONSULT NOTE ADULT - SUBJECTIVE AND OBJECTIVE BOX
[facility]  [phone]  NEPHROLOGY      INITIAL CONSULT NOTE  --------------------------------------------------------------------------------  HPI:    32 yo M with hx of Li use/HTN ---> ESRD, on dialysis. recently admitted with diverticulitis, treated with abx cipro. 2 d pta developed low grade fever. went to hd where noticed with fever to 101F which subsided to 100.4 range. given ceftaz 2 g iv and was to see surgeon. had no abd pain. saw pmd where temp was 102.5F sent to er.       PAST HISTORY  --------------------------------------------------------------------------------  PAST MEDICAL & SURGICAL HISTORY:  ESRD (end stage renal disease)  No significant past surgical history    FAMILY HISTORY:  No pertinent family history in first degree relatives    PAST SOCIAL HISTORY: no tobacco no alcohol     ALLERGIES & MEDICATIONS  --------------------------------------------------------------------------------  Allergies    minocycline (Other)  Prozac (Other)  red dye (Unknown)    Intolerances      Standing Inpatient Medications    PRN Inpatient Medications      REVIEW OF SYSTEMS  --------------------------------------------------------------------------------  Gen: +   fevers/chills   Skin: No rashes  Head/Eyes/Ears/Mouth: No headache; Normal hearing;  No sinus pain/discomfort, sore throat +   Respiratory: No dyspnea,  cough, wheezing, hemoptysis  CV: No chest pain, or palp   GI: No abdominal pain, diarrhea, nausea, vomiting,  : No dysuria, decrease urination or hesitancy urinating  hematuria, nocturia  MSK: No joint pain/swelling; no back pain  Neuro: No dizziness/lightheadedness,   also with no edema     All other systems were reviewed and are negative, except as noted.    VITALS/PHYSICAL EXAM  --------------------------------------------------------------------------------  T(C): 37.7 (06-03-19 @ 18:00), Max: 38.2 (06-03-19 @ 15:13)  HR: 98 (06-03-19 @ 19:55) (98 - 111)  BP: 117/78 (06-03-19 @ 19:55) (117/78 - 139/82)  RR: 18 (06-03-19 @ 19:55) (18 - 20)  SpO2: 98% (06-03-19 @ 19:55) (97% - 99%)  Wt(kg): --  Height (cm): 193.04 (06-03-19 @ 15:13)  Weight (kg): 119.7 (06-03-19 @ 15:13)  BMI (kg/m2): 32.1 (06-03-19 @ 15:13)  BSA (m2): 2.49 (06-03-19 @ 15:13)      Physical Exam:  	Gen: Non toxic comfortable appearing   	no jvd , supple neck,   	Pulm: decrease bs  no rales or ronchi or wheezing  	CV: RRR, S1S2; no rub  	Back: No CVA tenderness; no sacral edema  	Abd: +BS, soft, nontender/nondistended  	: No suprapubic tenderness  	UE: Warm, no cyanosis  no clubbing,  no edema  	LE: Warm, no cyanosis  no clubbing, no edema  	Neuro: alert and oriented. speech coherent   	Skin: Warm, no decrease skin turgor   	Vascular access: + avf + bruit and thrill     LABS/STUDIES  --------------------------------------------------------------------------------              10.2   5.3   >-----------<  200      [06-03-19 @ 18:20]              29.1     134  |  91  |  35  ----------------------------<  148      [06-03-19 @ 18:20]  3.4   |  28  |  9.56        Ca     8.6     [06-03-19 @ 18:20]    TPro  6.7  /  Alb  3.9  /  TBili  0.4  /  DBili  x   /  AST  16  /  ALT  22  /  AlkPhos  57  [06-03-19 @ 18:20]    PT/INR: PT 14.1 , INR 1.23       [06-03-19 @ 18:20]  PTT: 27.0       [06-03-19 @ 18:20]      Creatinine Trend:  SCr 9.56 [06-03 @ 18:20]  SCr 15.12 [05-20 @ 07:06]  SCr 13.70 [05-19 @ 06:50]  SCr 10.65 [05-18 @ 00:01]    Urinalysis - [06-03-19 @ 18:20]      Color Colorless / Appearance Clear / SG 1.006 / pH 8.5      Gluc 100 mg/dL / Ketone Negative  / Bili Negative / Urobili Negative       Blood Trace / Protein 100 / Leuk Est Negative / Nitrite Negative      RBC 1 / WBC 0 / Hyaline 0 / Gran  / Sq Epi  / Non Sq Epi 0 / Bacteria Negative

## 2019-06-03 NOTE — ED CLERICAL - NS ED CLERK NOTE PRE-ARRIVAL INFORMATION; ADDITIONAL PRE-ARRIVAL INFORMATION
CC/Reason For referral:  Fever, recently post op  Preferred Consultant(if applicable):  Rivera Collins for surgery, Dr. Durán for Nephrology  Who admits for you (if needed):  [facility]  Do you have documents you would like to fax over?  no  Would you still like to speak to an ED attending?  yes

## 2019-06-03 NOTE — ED ADULT TRIAGE NOTE - CHIEF COMPLAINT QUOTE
nausea , fever, headache  From MD  Dialysis ESRD Lithium toxicity nausea , fever, headache  From MD  Dialysis ESRD Lithium toxicity  Mon Wed Fri dialysis

## 2019-06-03 NOTE — ED ADULT NURSE NOTE - OBJECTIVE STATEMENT
32 y/o on hemodialysis for ESRD (from taking Lithium). Last dialysis today. Patient has a fistula on the left upper arm with + thrill and bruit. Recently discharged following admission for diverticulitis. Presenting today with persistent fever. Given 2g of Fortez at dialysis.

## 2019-06-04 LAB
CULTURE RESULTS: NO GROWTH — SIGNIFICANT CHANGE UP
SPECIMEN SOURCE: SIGNIFICANT CHANGE UP

## 2019-06-08 LAB
CULTURE RESULTS: SIGNIFICANT CHANGE UP
CULTURE RESULTS: SIGNIFICANT CHANGE UP
SPECIMEN SOURCE: SIGNIFICANT CHANGE UP
SPECIMEN SOURCE: SIGNIFICANT CHANGE UP

## 2019-07-18 ENCOUNTER — APPOINTMENT (OUTPATIENT)
Dept: COLORECTAL SURGERY | Facility: HOSPITAL | Age: 34
End: 2019-07-18

## 2019-08-28 NOTE — PATIENT PROFILE ADULT - NSPROMEDSPUMP_GEN_A_NUR
Patient called requesting to talk to the nurse regarding his inr. Also the patient states he is having problems with his toe. Please call. none

## 2020-03-20 ENCOUNTER — EMERGENCY (EMERGENCY)
Facility: HOSPITAL | Age: 35
LOS: 1 days | Discharge: HOME | End: 2020-03-20
Attending: EMERGENCY MEDICINE | Admitting: EMERGENCY MEDICINE
Payer: MEDICARE

## 2020-03-20 VITALS
TEMPERATURE: 98 F | HEART RATE: 90 BPM | WEIGHT: 220.02 LBS | SYSTOLIC BLOOD PRESSURE: 150 MMHG | RESPIRATION RATE: 15 BRPM | HEIGHT: 70 IN | DIASTOLIC BLOOD PRESSURE: 85 MMHG | OXYGEN SATURATION: 100 %

## 2020-03-20 VITALS
DIASTOLIC BLOOD PRESSURE: 77 MMHG | HEART RATE: 86 BPM | RESPIRATION RATE: 16 BRPM | TEMPERATURE: 98 F | SYSTOLIC BLOOD PRESSURE: 146 MMHG | OXYGEN SATURATION: 100 %

## 2020-03-20 LAB
ALBUMIN SERPL ELPH-MCNC: 3.7 G/DL — SIGNIFICANT CHANGE UP (ref 3.3–5)
ALP SERPL-CCNC: 99 U/L — SIGNIFICANT CHANGE UP (ref 40–120)
ALT FLD-CCNC: 26 U/L — SIGNIFICANT CHANGE UP (ref 12–78)
ANION GAP SERPL CALC-SCNC: 8 MMOL/L — SIGNIFICANT CHANGE UP (ref 5–17)
AST SERPL-CCNC: 17 U/L — SIGNIFICANT CHANGE UP (ref 15–37)
BASOPHILS # BLD AUTO: 0.04 K/UL — SIGNIFICANT CHANGE UP (ref 0–0.2)
BASOPHILS NFR BLD AUTO: 0.4 % — SIGNIFICANT CHANGE UP (ref 0–2)
BILIRUB SERPL-MCNC: 0.6 MG/DL — SIGNIFICANT CHANGE UP (ref 0.2–1.2)
BUN SERPL-MCNC: 47 MG/DL — HIGH (ref 7–23)
CALCIUM SERPL-MCNC: 9.1 MG/DL — SIGNIFICANT CHANGE UP (ref 8.5–10.1)
CHLORIDE SERPL-SCNC: 94 MMOL/L — LOW (ref 96–108)
CK SERPL-CCNC: 207 U/L — SIGNIFICANT CHANGE UP (ref 26–308)
CO2 SERPL-SCNC: 32 MMOL/L — HIGH (ref 22–31)
CREAT SERPL-MCNC: 9.1 MG/DL — HIGH (ref 0.5–1.3)
EOSINOPHIL # BLD AUTO: 0.02 K/UL — SIGNIFICANT CHANGE UP (ref 0–0.5)
EOSINOPHIL NFR BLD AUTO: 0.2 % — SIGNIFICANT CHANGE UP (ref 0–6)
GLUCOSE SERPL-MCNC: 92 MG/DL — SIGNIFICANT CHANGE UP (ref 70–99)
HCT VFR BLD CALC: 33.5 % — LOW (ref 39–50)
HGB BLD-MCNC: 11.4 G/DL — LOW (ref 13–17)
IMM GRANULOCYTES NFR BLD AUTO: 0.8 % — SIGNIFICANT CHANGE UP (ref 0–1.5)
LYMPHOCYTES # BLD AUTO: 0.55 K/UL — LOW (ref 1–3.3)
LYMPHOCYTES # BLD AUTO: 5.4 % — LOW (ref 13–44)
MAGNESIUM SERPL-MCNC: 1.9 MG/DL — SIGNIFICANT CHANGE UP (ref 1.6–2.6)
MCHC RBC-ENTMCNC: 30.2 PG — SIGNIFICANT CHANGE UP (ref 27–34)
MCHC RBC-ENTMCNC: 34 GM/DL — SIGNIFICANT CHANGE UP (ref 32–36)
MCV RBC AUTO: 88.9 FL — SIGNIFICANT CHANGE UP (ref 80–100)
MONOCYTES # BLD AUTO: 0.91 K/UL — HIGH (ref 0–0.9)
MONOCYTES NFR BLD AUTO: 8.9 % — SIGNIFICANT CHANGE UP (ref 2–14)
NEUTROPHILS # BLD AUTO: 8.62 K/UL — HIGH (ref 1.8–7.4)
NEUTROPHILS NFR BLD AUTO: 84.3 % — HIGH (ref 43–77)
NRBC # BLD: 0 /100 WBCS — SIGNIFICANT CHANGE UP (ref 0–0)
PLATELET # BLD AUTO: 189 K/UL — SIGNIFICANT CHANGE UP (ref 150–400)
POTASSIUM SERPL-MCNC: 4.1 MMOL/L — SIGNIFICANT CHANGE UP (ref 3.5–5.3)
POTASSIUM SERPL-SCNC: 4.1 MMOL/L — SIGNIFICANT CHANGE UP (ref 3.5–5.3)
PROT SERPL-MCNC: 7.4 G/DL — SIGNIFICANT CHANGE UP (ref 6–8.3)
RBC # BLD: 3.77 M/UL — LOW (ref 4.2–5.8)
RBC # FLD: 13.5 % — SIGNIFICANT CHANGE UP (ref 10.3–14.5)
SODIUM SERPL-SCNC: 134 MMOL/L — LOW (ref 135–145)
WBC # BLD: 10.22 K/UL — SIGNIFICANT CHANGE UP (ref 3.8–10.5)
WBC # FLD AUTO: 10.22 K/UL — SIGNIFICANT CHANGE UP (ref 3.8–10.5)

## 2020-03-20 PROCEDURE — 70450 CT HEAD/BRAIN W/O DYE: CPT

## 2020-03-20 PROCEDURE — 70450 CT HEAD/BRAIN W/O DYE: CPT | Mod: 26

## 2020-03-20 PROCEDURE — 84484 ASSAY OF TROPONIN QUANT: CPT

## 2020-03-20 PROCEDURE — 93005 ELECTROCARDIOGRAM TRACING: CPT

## 2020-03-20 PROCEDURE — 99284 EMERGENCY DEPT VISIT MOD MDM: CPT | Mod: 25

## 2020-03-20 PROCEDURE — 85027 COMPLETE CBC AUTOMATED: CPT

## 2020-03-20 PROCEDURE — 71046 X-RAY EXAM CHEST 2 VIEWS: CPT | Mod: 26

## 2020-03-20 PROCEDURE — 99284 EMERGENCY DEPT VISIT MOD MDM: CPT

## 2020-03-20 PROCEDURE — 83735 ASSAY OF MAGNESIUM: CPT

## 2020-03-20 PROCEDURE — 71046 X-RAY EXAM CHEST 2 VIEWS: CPT

## 2020-03-20 PROCEDURE — 36415 COLL VENOUS BLD VENIPUNCTURE: CPT

## 2020-03-20 PROCEDURE — 80053 COMPREHEN METABOLIC PANEL: CPT

## 2020-03-20 PROCEDURE — 93010 ELECTROCARDIOGRAM REPORT: CPT

## 2020-03-20 PROCEDURE — 82550 ASSAY OF CK (CPK): CPT

## 2020-03-20 PROCEDURE — 82962 GLUCOSE BLOOD TEST: CPT

## 2020-03-20 NOTE — ED PROVIDER NOTE - NSFOLLOWUPINSTRUCTIONS_ED_ALL_ED_FT
Follow up with your primary care doctor/specialist as soon as possible. Discuss any results or new medication with your primary care doctor. Return to the emergency room for any worsening condition or new symptoms.     Find a Physician referral helpline at [phone].     SEEK IMMEDIATE MEDICAL CARE IF YOU HAVE ANY OF THE FOLLOWING SYMPTOMS: chest pain, coughing up blood, unexplained back/neck/jaw pain, severe abdominal pain, dizziness or lightheadedness, fainting, shortness of breath, sweaty or clammy skin, vomiting, severe headache or racing heart beat. These symptoms may represent a serious problem that is an emergency. Do not wait to see if the symptoms will go away. Get medical help right away. Call 911 and do not drive yourself to the hospital.

## 2020-03-20 NOTE — ED PROVIDER NOTE - OBJECTIVE STATEMENT
33 y/o male with PMHx ESRD w/ dialysis (M/W/F), HTN, anxiety, and depression BIBA due to possible seizure at [facility]. pt reports he does not recall event. pt reports minimal sinus headache in which normally presents when he receives oxygen via nasal cannula. pt reports intermittent muscle cramping at back, arms, and legs which occurred Wednesday, which improved with drinking water and other wise feeling well. Pt reports otherwise feeling well today and dialysis was full completed. pt denies fever, chills, worst headache of life, neck stiffness, vomiting, head injury, numbness/weakness, chest pain, SOB, or any other complaints.

## 2020-03-20 NOTE — ED PROVIDER NOTE - PHYSICAL EXAMINATION
Constitutional: Awake, Alert, non-toxic. NAD. Well appearing, well nourished.   HEAD: Normocephalic, atraumatic.   EYES: PERRL, EOM intact, conjunctiva and sclera are clear bilaterally. No raccoon eyes.   ENT: No rhinorrhea, normal pharynx, patent, mucous membranes pink/moist  NECK: Supple, non-tender  BACK: No midline or paraspinal TTP of cervical/thoracic/lumbar spine, FROM. No ecchymosis or hematomas.   CARDIOVASCULAR: Normal S1, S2; regular rate and rhythm.  RESPIRATORY: Normal respiratory effort; breath sounds CTAB, no wheezes, rhonchi, or rales. Speaking in full sentences. No accessory muscle use.   ABDOMEN: Soft; non-tender, non-distended.   EXTREMITIES: Full passive and active ROM in all extremities; non-tender to palpation; distal pulses palpable and symmetric, no edema, no crepitus or step off, shunt to left arm with palpable thrill  SKIN: Warm, dry; good skin turgor, no apparent lesions or rashes, no ecchymosis, brisk capillary refill.  NEURO: A&O x3. Sensory and motor functions are grossly intact. Speech is normal. Appearance and judgement seem appropriate for gender and age. No neurological deficits. Neurovascular sensation intact motor function 5/5 of upper and lower extremities, CN II-XII grossly intact, no ataxia, absent romberg and pronator drift, intact cerebellar function. Speech clear, without articulation or word-finding difficulties.

## 2020-03-20 NOTE — ED ADULT NURSE NOTE - OBJECTIVE STATEMENT
Pt received sitting on stretcher in NAD. Pt AOx3 C/o having a seizure while dialysis. . Neuro WNL. PERRLA. Lungs CTA, RR even unlabored. Ab soft non tender, + bowel sounds x 4quads. Denies Nausea, Vomiting, Diarrhea. Skin warm, dry, color appropriate for age and race.

## 2020-03-20 NOTE — ED PROVIDER NOTE - ATTENDING CONTRIBUTION TO CARE
pt is a 33 yo male who is morbidly obese, hx of esrd with renal failure secondary to lithium toxicity hx of depresseion anxiety and htn was just completing hd and stood up felt light headed and had myoclonic jerking and passed out briefly no tongue biting no incontinence no cp no sob  no trauma no fever no chills   earlier in the week pt had diarrhea after dietary indescretion and now feeling better  was told during hd to get more fluids  on eval  wd wn male nad  heent cor chest normal  vascualr hd fistula lue with thrill lue by wrist failed good cap refill  neuro normal  ext normal   skin normal  plan  syncope not liekly seizure  labs ro fluid shifts and orthostasis ekg for arrhythmia ct scan heparinized therapy during hd reeval and dispo

## 2020-03-20 NOTE — ED PROVIDER NOTE - PROGRESS NOTE DETAILS
Discussed with [place] Brandt Stephenson, reports patient had a reading for low blood pressure while having legs down in which patient became nauseous, patient eyes rolled back, and started shivering in the shoulders. notes not typical tonic clonic movement. Notes patient was able to recall nurse name after event but could not recall what happened. She notes no head injury or LOC. Reevaluated patient at bedside. Patient notes improvement of symptoms. Discussed results with the patient and provided copies.  All questions were answered. Discussed the importance of prompt, close medical follow-up. Patient will return with any changes, concerns or persistent/worsening symptoms.  Patient verbalized understanding.

## 2020-03-20 NOTE — ED PROVIDER NOTE - CLINICAL SUMMARY MEDICAL DECISION MAKING FREE TEXT BOX
BIBA due to possible seizure after finishing dialysis. Questionable seizure vs syncopal episode. Plan includes lab, EKG, troponin, CXR, Ct head, re-assess

## 2020-04-01 ENCOUNTER — OUTPATIENT (OUTPATIENT)
Dept: OUTPATIENT SERVICES | Facility: HOSPITAL | Age: 35
LOS: 1 days | End: 2020-04-01
Payer: MEDICARE

## 2020-04-01 PROCEDURE — G9001: CPT

## 2020-04-13 DIAGNOSIS — Z71.89 OTHER SPECIFIED COUNSELING: ICD-10-CM

## 2020-04-13 PROBLEM — I10 ESSENTIAL (PRIMARY) HYPERTENSION: Chronic | Status: ACTIVE | Noted: 2020-03-20

## 2020-04-13 PROBLEM — Z91.15: Chronic | Status: ACTIVE | Noted: 2020-03-20

## 2020-08-12 NOTE — ED CLERICAL - NS ED CLERK NOTE PRE-ARRIVAL INFORMATION; ADDITIONAL PRE-ARRIVAL INFORMATION
CC/Reason For referral: Hx of ESRD from Lithium Toxicity, Fever, Lower Abdominal Pain, Needs CAT Scan  Preferred Consultant(if applicable): Nephrology  Who admits for you (if needed): N/A  Do you have documents you would like to fax over? No  Would you still like to speak to an ED attending? No
911 or go to the nearest Emergency Room

## 2021-06-18 NOTE — ED ADULT TRIAGE NOTE - PATIENT ON (OXYGEN DELIVERY METHOD)
Discharge Instructions       PATIENT ID: Carmelo Fry  MRN: [id number]   YOB: 1932    DATE OF ADMISSION: 6/15/2021  9:37 PM    DATE OF DISCHARGE: 6/18/2021    PRIMARY CARE PROVIDER: Monica Shelton MD     ATTENDING PHYSICIAN: Stephanie Lockhart MD  DISCHARGING PROVIDER: Clementina Negrete MD    To contact this individual call [phone] and ask the  to page. If unavailable ask to be transferred the Adult Hospitalist Department. DISCHARGE DIAGNOSES acute kidney injury    CONSULTATIONS: IP CONSULT TO NEPHROLOGY  IP CONSULT TO INFECTIOUS DISEASES  IP CONSULT TO VASCULAR SURGERY    PROCEDURES/SURGERIES: * No surgery found *    PENDING TEST RESULTS:   At the time of discharge the following test results are still pending: None    FOLLOW UP APPOINTMENTS:   Follow-up Information     Follow up With Specialties Details Why Contact Info    Monica Shelton MD Internal Medicine   [facility] [address] [place] [address] 7  [phone]             ADDITIONAL CARE RECOMMENDATIONS: Wound care    DIET: Renal Diet  Oral Nutritional Supplements: Nepro Three times daily     ACTIVITY: Activity as tolerated    WOUND CARE: yes    EQUIPMENT needed: n      DISCHARGE MEDICATIONS:   See Medication Reconciliation Form    · It is important that you take the medication exactly as they are prescribed. · Keep your medication in the bottles provided by the pharmacist and keep a list of the medication names, dosages, and times to be taken in your wallet. · Do not take other medications without consulting your doctor. NOTIFY YOUR PHYSICIAN FOR ANY OF THE FOLLOWING:   Fever over 101 degrees for 24 hours. Chest pain, shortness of breath, fever, chills, nausea, vomiting, diarrhea, change in mentation, falling, weakness, bleeding. Severe pain or pain not relieved by medications. Or, any other signs or symptoms that you may have questions about.       DISPOSITION:    Home With:   OT  PT  [facility]  RN SNF/Inpatient Rehab/LTAC    Independent/assisted living    Hospice    Other:         PROBLEM LIST Updated:  Yes y       Signed:   Nba Marquez MD  6/18/2021  12:11 PM mask, nonrebreather

## 2022-08-19 NOTE — ED PROVIDER NOTE - CROS ED CONS ALL NEG
Discharge instructions gone over with pt and allowed time to ask questions. Pt verbalized understanding of discharge instructions and follow-up instructions. Pt unhooked from monitor and changed out of gown. No IV present. Exit shown. Pt wheeled out and daughter to drive her home.    negative...

## 2022-12-20 ENCOUNTER — APPOINTMENT (OUTPATIENT)
Dept: NEPHROLOGY | Facility: CLINIC | Age: 37
End: 2022-12-20

## 2022-12-20 ENCOUNTER — APPOINTMENT (OUTPATIENT)
Dept: TRANSPLANT | Facility: CLINIC | Age: 37
End: 2022-12-20

## 2023-09-15 NOTE — ED PROVIDER NOTE - PRINCIPAL DIAGNOSIS
balance training/bed mobility training/gait training/strengthening/transfer training
Diverticulitis of large intestine with perforation, unspecified bleeding status

## 2024-01-17 ENCOUNTER — OUTPATIENT (OUTPATIENT)
Dept: OUTPATIENT SERVICES | Facility: HOSPITAL | Age: 39
LOS: 1 days | End: 2024-01-17
Payer: MEDICARE

## 2024-01-17 ENCOUNTER — APPOINTMENT (OUTPATIENT)
Dept: ULTRASOUND IMAGING | Facility: CLINIC | Age: 39
End: 2024-01-17
Payer: MEDICARE

## 2024-01-17 ENCOUNTER — APPOINTMENT (OUTPATIENT)
Dept: RADIOLOGY | Facility: CLINIC | Age: 39
End: 2024-01-17
Payer: MEDICARE

## 2024-01-17 DIAGNOSIS — Z00.8 ENCOUNTER FOR OTHER GENERAL EXAMINATION: ICD-10-CM

## 2024-01-17 PROCEDURE — 76700 US EXAM ABDOM COMPLETE: CPT | Mod: 26

## 2024-01-17 PROCEDURE — 71046 X-RAY EXAM CHEST 2 VIEWS: CPT

## 2024-01-17 PROCEDURE — 76700 US EXAM ABDOM COMPLETE: CPT

## 2024-01-17 PROCEDURE — 71046 X-RAY EXAM CHEST 2 VIEWS: CPT | Mod: 26

## 2024-03-29 ENCOUNTER — APPOINTMENT (OUTPATIENT)
Dept: CT IMAGING | Facility: CLINIC | Age: 39
End: 2024-03-29
Payer: COMMERCIAL

## 2024-03-29 ENCOUNTER — OUTPATIENT (OUTPATIENT)
Dept: OUTPATIENT SERVICES | Facility: HOSPITAL | Age: 39
LOS: 1 days | End: 2024-03-29
Payer: COMMERCIAL

## 2024-03-29 DIAGNOSIS — Z00.8 ENCOUNTER FOR OTHER GENERAL EXAMINATION: ICD-10-CM

## 2024-03-29 PROCEDURE — 74176 CT ABD & PELVIS W/O CONTRAST: CPT | Mod: 26

## 2024-03-29 PROCEDURE — 74176 CT ABD & PELVIS W/O CONTRAST: CPT
